# Patient Record
Sex: MALE | Race: WHITE | Employment: UNEMPLOYED | URBAN - METROPOLITAN AREA
[De-identification: names, ages, dates, MRNs, and addresses within clinical notes are randomized per-mention and may not be internally consistent; named-entity substitution may affect disease eponyms.]

---

## 2017-12-07 ENCOUNTER — HOSPITAL ENCOUNTER (OUTPATIENT)
Dept: GENERAL RADIOLOGY | Age: 62
Discharge: HOME | End: 2017-12-07
Attending: FAMILY MEDICINE
Payer: COMMERCIAL

## 2017-12-07 DIAGNOSIS — R05.9 COUGH: ICD-10-CM

## 2017-12-07 PROCEDURE — 71020 XR CHEST PA LAT: CPT

## 2023-01-27 ENCOUNTER — HOSPITAL ENCOUNTER (OUTPATIENT)
Age: 68
Setting detail: OUTPATIENT SURGERY
Discharge: HOME | End: 2023-01-27
Attending: SPECIALIST | Admitting: SPECIALIST
Payer: MEDICARE

## 2023-01-27 VITALS
WEIGHT: 309.7 LBS | BODY MASS INDEX: 36.57 KG/M2 | SYSTOLIC BLOOD PRESSURE: 126 MMHG | OXYGEN SATURATION: 95 % | HEART RATE: 59 BPM | HEIGHT: 77 IN | RESPIRATION RATE: 16 BRPM | DIASTOLIC BLOOD PRESSURE: 92 MMHG | TEMPERATURE: 97.5 F

## 2023-01-27 DIAGNOSIS — R94.39 ABNORMAL STRESS TEST: ICD-10-CM

## 2023-01-27 PROCEDURE — C1769 GUIDE WIRE: HCPCS | Performed by: SPECIALIST

## 2023-01-27 PROCEDURE — 77030029065 HC DRSG HEMO QCLOT ZMED -B

## 2023-01-27 PROCEDURE — 74011000250 HC RX REV CODE- 250: Performed by: SPECIALIST

## 2023-01-27 PROCEDURE — 77030003390 HC NDL ANGI MRTM -A: Performed by: SPECIALIST

## 2023-01-27 PROCEDURE — 74011000636 HC RX REV CODE- 636: Performed by: SPECIALIST

## 2023-01-27 PROCEDURE — 93458 L HRT ARTERY/VENTRICLE ANGIO: CPT | Performed by: SPECIALIST

## 2023-01-27 PROCEDURE — 2709999900 HC NON-CHARGEABLE SUPPLY: Performed by: SPECIALIST

## 2023-01-27 PROCEDURE — 74011250636 HC RX REV CODE- 250/636: Performed by: SPECIALIST

## 2023-01-27 PROCEDURE — C1894 INTRO/SHEATH, NON-LASER: HCPCS | Performed by: SPECIALIST

## 2023-01-27 PROCEDURE — 99152 MOD SED SAME PHYS/QHP 5/>YRS: CPT | Performed by: SPECIALIST

## 2023-01-27 PROCEDURE — 77030041064 HC CATH DX ANGI DXTRTY MEDT -B: Performed by: SPECIALIST

## 2023-01-27 RX ORDER — HEPARIN SODIUM 200 [USP'U]/100ML
INJECTION, SOLUTION INTRAVENOUS
Status: COMPLETED | OUTPATIENT
Start: 2023-01-27 | End: 2023-01-27

## 2023-01-27 RX ORDER — SILODOSIN 8 MG/1
8 CAPSULE ORAL
COMMUNITY

## 2023-01-27 RX ORDER — SODIUM CHLORIDE 0.9 % (FLUSH) 0.9 %
5-40 SYRINGE (ML) INJECTION AS NEEDED
Status: CANCELLED | OUTPATIENT
Start: 2023-01-27

## 2023-01-27 RX ORDER — DIPHENHYDRAMINE HYDROCHLORIDE 50 MG/ML
25 INJECTION, SOLUTION INTRAMUSCULAR; INTRAVENOUS
Status: CANCELLED | OUTPATIENT
Start: 2023-01-27 | End: 2023-01-28

## 2023-01-27 RX ORDER — MIDAZOLAM HYDROCHLORIDE 1 MG/ML
INJECTION, SOLUTION INTRAMUSCULAR; INTRAVENOUS AS NEEDED
Status: DISCONTINUED | OUTPATIENT
Start: 2023-01-27 | End: 2023-01-27 | Stop reason: HOSPADM

## 2023-01-27 RX ORDER — FENTANYL CITRATE 50 UG/ML
INJECTION, SOLUTION INTRAMUSCULAR; INTRAVENOUS AS NEEDED
Status: DISCONTINUED | OUTPATIENT
Start: 2023-01-27 | End: 2023-01-27 | Stop reason: HOSPADM

## 2023-01-27 RX ORDER — SILDENAFIL 100 MG/1
25 TABLET, FILM COATED ORAL
COMMUNITY

## 2023-01-27 RX ORDER — LIDOCAINE HYDROCHLORIDE 10 MG/ML
INJECTION INFILTRATION; PERINEURAL AS NEEDED
Status: DISCONTINUED | OUTPATIENT
Start: 2023-01-27 | End: 2023-01-27 | Stop reason: HOSPADM

## 2023-01-27 RX ORDER — HYDROCORTISONE SODIUM SUCCINATE 100 MG/2ML
100 INJECTION, POWDER, FOR SOLUTION INTRAMUSCULAR; INTRAVENOUS
Status: CANCELLED | OUTPATIENT
Start: 2023-01-27 | End: 2023-01-28

## 2023-01-27 RX ORDER — METOPROLOL TARTRATE 50 MG/1
TABLET ORAL 2 TIMES DAILY
COMMUNITY

## 2023-01-27 RX ORDER — SODIUM CHLORIDE 0.9 % (FLUSH) 0.9 %
5-40 SYRINGE (ML) INJECTION EVERY 8 HOURS
Status: CANCELLED | OUTPATIENT
Start: 2023-01-27

## 2023-01-27 RX ORDER — SODIUM CHLORIDE 9 MG/ML
125 INJECTION, SOLUTION INTRAVENOUS CONTINUOUS
Status: CANCELLED | OUTPATIENT
Start: 2023-01-27 | End: 2023-01-27

## 2023-01-27 RX ORDER — ATORVASTATIN CALCIUM 40 MG/1
TABLET, FILM COATED ORAL DAILY
COMMUNITY

## 2023-01-27 RX ORDER — ASPIRIN 81 MG/1
TABLET ORAL DAILY
COMMUNITY

## 2023-01-27 NOTE — DISCHARGE INSTRUCTIONS
Discharge Instructions:   Cardiac Catheterization/Angiography Discharge Instructions    *Check the puncture site frequently for swelling or bleeding. If you see any bleeding, lie down and apply pressure over the area and call 911. Notify your doctor for any redness, swelling, drainage or oozing from the puncture site. Notify your doctor for any fever or chills. *If the leg with the puncture becomes cold, numb or painful, call Dr cardiologist or PCP at  [phone]    *Activity should be limited for the next 48 hours. Climb stairs as little as possible and avoid any stooping, bending or strenuous activity for 48 hours. No heavy lifting (anything over 10 pounds) for five days. *Do not drive for 24 hours. *You may resume your usual diet. Drink more fluids than usual.    *Have a responsible person drive you home and stay with you for at least 24 hours after your heart catheterization/angiography. *You may remove the bandage from your groin in 24 hours. You may shower in 24 hours. No tub baths, hot tubs or swimming for one week. Do not place any lotions, creams, powders, ointments over the puncture site for one week. You may place a clean band-aid over the puncture site each day for 5 days. Change this daily. Medications: Activity:     As tolerated except do not lift over 10 pounds for 5 days. Diet:     American Heart Association. Follow-up:     Follow up with Shilpa Ling MD on February 13th, 2023 at [address] [facility]  [phone]      If you smoke, STOP!

## 2023-01-27 NOTE — H&P
Date of Surgery Update:  Alberto Stokes was seen and examined. History and physical has been reviewed. The patient has been examined. There have been no significant clinical changes since the completion of the originally dated History and Physical.    Signed By: Suzi Fierro MD     January 27, 2023 12:10 PM       +CP  Cath (5/12/17): Normal cors. ETT (12/13/22):  +CP, - EKG  Cardiolite (12/272/22): Reversible septal/apical defect. EF 38% with sept/apical HK. Deny Galvin 1955 Default Progress NoteVisit Date: Mon, Jan 16, 2023 3:45 pmProvider: Chiara Patel MD (Assistant: Shayla Yu )Location: [facility] AT [facility])[address] [place]. [address] [phone] signed by Randy Sullivan MD on  01/16/2023 04:42:06 PM                         Subjective:CC: Mr. Demar Huston is a 74year old White male. His primary care physician is [facility] [place]. This is a 1  month follow-up visit. Since his last visit, he has had the following testing: event monitor (Event monitor), nuclear stress test (Nuclear stress test), and treadmill stress test (Treadmill stress test). Medication list reviewed. He has a history of coronary artery disease of the native coronary artery, hypertension, and syncope. HPI:   Coronary Artery Disease: Currently, his treatment regimen consists of daily 81 mg aspirin,  Lopressor, and Lipitor. Current symptoms include chest pain, lower extremity edema and shortness of breath. He denies dizziness. Mr. Demar Huston is compliant with alcohol avoidance, tobacco avoidance, taking medications as recommended and prescribed, and the treatment plan. The patient has had prior EBT score of 45 on 5/4/17, exercise stress test (reported performed Treadmill Stress Test: 12/13/22 - 12:17 There was no ECG evidence of ischemia. The patient experienced chest discomfort during stress testing. Normal blood pressure response to exercise.  Blunted heart rate response secondary to medication. Fair exercise capacity. Patient is scheduled for [facility]. ), echocardiogram ( performed 9/8/22: EF 55% There is severe concentric left ventricular hypertrophy. The left atrium is severely enlarged. The right atrium is moderately enlarged. There is mild aortic regurgitation. There is mild to moderate mitral regurgitation. There is moderate tricuspid regurgitation. with moderate pulmonary hypertension. There is mild pulmonary regurgitation. ), and a stress echocardiogram has been done ( performed 5/1/20: 2 min 28 sec. No ECG evidence of ischemia. Poor exercise capacity ). Regarding hypertension:Type Primary Hypertension   Abnormal result of other cardiovascular function study noted. Prior work-up has included a nuclear imaging study ( results: 12/27/22 - Abnormal myocardial perfusion Tetrofosmin Myoview SPECT imaging showing a moderate area of of ischemia in the septal apical wall of the left ventricle. LVEF of 38%. There is apical septal hypokinesis. ). Regarding chest pain: The discomfort is located primarily in the center of the chest.  Typically, individual episodes of chest pain last several minutes. He characterizes the pain as pressure. It seems to be worse with exertion. Pain improves with rest.  Associated symptoms include dyspnea. Past Medical History / Family History / Social History: Last Reviewed on 1/16/2023 04:20 PM by Gema Donis Medical History: Coronary artery disease palpitations- NSVT Sleep Apnea: uses CPAP; Benign Prostatic Hypertrophy Osteoarthritis: primarily affecting the knees; Skin cancer: BCC;  S/P surgical resection; Obesity INFLUENZA VACCINE: 09/2022 COVID-19 VACCINE: was last done unknown date, phizer x3 Past Cardiac Procedures/Tests:Echocardiogram on 9/8/22 - Normal LV systolic function with an estimated ejection fraction of 55%. There is severe concentric left ventricular hypertrophy.   The left atrium is severely enlarged. The right atrium is moderately enlarged. There is mild aortic regurgitation. There is mild to moderate mitral regurgitation. There is moderate tricuspid regurgitation. with moderate pulmonary hypertension. There is mild pulmonary regurgitation. .Nuclear Study:  12/27/22 -  Abnormal myocardial perfusion Tetrofosmin Myoview SPECT imaging showing a moderate area of of ischemia in the septal apical wall of the left ventricle. LVEF of 38%. There is apical septal hypokinesis. Treadmill Stress Test:  12/13/22 - 12:17  There was no ECG evidence of ischemia. The patient experienced chest discomfort during stress testing. Normal  blood pressure response to exercise. Blunted heart rate response secondary to medication. Fair exercise capacity. Patient is scheduled for [facility]. Stress Echo:  10/21/15 - 6 mins 43 secs. There was no ECG evidence of ischemia. No ischemia noted on echocardiogram, normal pre & post global wall motion. The patient did not experience chest discomfort. Normal  blood pressure response to exercise. Normal heart rate response to exercise. Fair exercise capacity. There is mild mitral regurgitation. There is mild to moderate tricuspid regurgitation. with mild pulmonary hypertension. (PA pressure 41 mmHg). Holter Monitor:  10/21/15 - Baseline rhythm was normal sinus. Average heart rate was 93 bpm.    Minimum heart rate of 73 bpm and maximum heart rate of 135 bpm.    There were rare PACs. There were rare PVCs. 1 Ventricular couplets also noted. No pauses noted. No diary was returned. Event Monitor:  05/01/2020 to 05/15/2020.  few premature atrial contractions and premature ventricular contractions. Stress echo 5/3/17 - 6 mins. There was no ECG evidence of ischemia. No ischemia noted on echocardiogram, normal pre & post global wall motion. The patient did not experience chest discomfort. Normal  blood pressure response to exercise.  Normal heart rate response to exercise. Fair exercise capacity. There is mild mitral regurgitation. EBT 17  calcium score is 45Holter monitor 5/3/17 -   Baseline rhythm was normal sinus. Average heart rate was 80 bpm.   Minimum heart rate of 54 bpm and maximum heart rate of 119 bpm.   There were rare PACs. There were frequent PVCs. Ventricular couplets also noted. Ventricular triplets also noted. Runs of non-sustained ventricular tachycardia consisting of 15 beats lasting for 7 seconds. There were PVCs in bigeminy. No pauses noted. No arrhythmias noted during diary entries. Stress echo 2020 - 2 mins 28 secs. There was no ECG evidence of ischemia. No ischemia noted on echocardiogram, normal pre & post global wall motion. The patient did not experience chest discomfort. Normal  blood pressure response to exercise. Blunted heart rate response secondary to medication. Poor exercise capacity. The left atrium is moderately enlarged. There is mild aortic regurgitation. There is trace mitral regurgitation. There is trace tricuspid regurgitation. There is trace pulmonary regurgitation. 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 Alcohol: Non-drinker Substance Abuse History: Last Reviewed on 2023 04:20 PM by Miguel Whitehead Use/Abuse: None Mental Health History: Last Reviewed on 2023 04:20 PM by Nima House NECommunicable Diseases (eg STDs):  Last Reviewed on 2023 04:20 PM by Yessica Elliott Danisha NECurrent Problems: Last Reviewed on 1/16/2023 04:20 PM by Princess Kim ECGPrecordial painSyncope and collapseAbnormal electrocardiogram [ECG] [EKG]Chest painAbnormal EKGSyncopeAtherosclerotic heart disease of native coronary artery without angina pectorisParoxysmal VTCADSleep apneaOther specified postprocedural statesPrior Cardiac CatheterizationCoronary artery disease, of native coronary arteryDyspnea, unspecifiedShortness of breathVentricular premature depolarizationEncounter for preprocedural cardiovascular examinationAtherosclerotic heart disease of native coronary arteryCardiac arrhythmia, unspecifiedEssential (primary) hypertensionLocalized edemaOther chest painChest pain, unspecifiedAbnormal result of other cardiovascular function studyAllergies: Last Reviewed on 1/16/2023 04:20 PM by Andrea Quach Known Allergies. Current Medications: Last Reviewed on 1/16/2023 04:20 PM by Yolande Parra 100 mg oral tablet [prn as directed]aspirin 81 mg oral tablet, delayed release (enteric coated) [2 tablets (162mg) po qd]atorvastatin 40 mg oral tablet [TAKE 1 TABLET(40 MG) BY MOUTH EVERY DAY]metoprolol tartrate 50 mg oral tablet [TAKE 1 TABLET(50 MG) BY MOUTH THREE TIMES DAILY WITH MEALS]Objective:Vitals: Historical: 12/12/2022  BP:    ( (left arm, , sitting, );) 12/12/2022  BP:   126/72 mm Hg ( (left arm, , sitting, );) 12/12/2022  Wt:   312lbsCurrent: 1/16/2023 4:19:33 PMHt:  6 ft, 5 in; Wt: 313 lbs;  BMI: 37. 1BP: 130/74 mm Hg (left arm, sitting); P: 57 bpm (left arm (BP Cuff), sitting);  sCr: 1 mg/dL;  GFR: 96. 96Repeat: 4:19:59 PM  BP:   141/74mm Hg (left arm, standing) Exams: GENERAL:  Alert, oriented to person, place and time. HEENT:  Pinkish palpebral  conjunctivae. Anicteric sclerae. NECK:  No jugular vein engorgement. No bruit. CHEST: Equal expansion. Clear breath sounds. No rales, no wheezing. Heart: Reg rate and rhythm.  Grade 2/6 systolic ejection murmur at the left sternal border area. ABDOMEN:  Soft. Normal active bowel sounds. No tenderness. Extremities: 1+ pitting pedal edema. NEURO:  Grossly intact. Lab/Test Results: Sodium, Serum: 140 (03/16/2022), Potassium, Serum: 4.4 (03/16/2022), Glucose Serum: 103 (03/16/2022), BUN serum/plasma (sCnc): 15 (03/16/2022), Creatinine, Serum: 1.0 (03/16/2022), Total Cholesterol: 100 (03/16/2022), Triglycerides: 84 (03/16/2022), HDL Target >55: 38 (03/16/2022), LDL Target <110: 45 (03/16/2022), AST (SGOT): 26 (03/16/2022), ALT (SGPT): 26 (03/16/2022), WBC count: 7.61 (03/16/2022), RBC count: 4.94 (03/16/2022), Hgb: 15.2 (03/16/2022), Hct: 45.3 (03/16/2022), MCV: 91.7 (03/16/2022), Platelets: 191 (03/18/8129), eGFR-CLARITZA: 60+ (03/16/2022), Procedures: Atherosclerotic heart disease of native coronary artery without angina pectorisECG INTERPRETATION: See scanned EKG for results.   Assessment: I25.10   Atherosclerotic heart disease of native coronary artery without angina pectoris   I10   Essential (primary) hypertension   I25.1   Atherosclerotic heart disease of native coronary artery   I10   Essential (primary) hypertension   R94.39   Abnormal result of other cardiovascular function study   R07.89   Other chest pain   R07.89   Other chest pain   ORDERS: Lab Orders:     93402  Basic metabolic panel (Ca, CO2, Cl, Creatinine, Glu, K, Na, BUN)  (Send-Out)          07795  Complete (CBC), automated (Hgb, Hct, RBC, WBC, and platelet count)  (In-House)          12764  Magnesium level  (Send-Out)      Procedures Ordered:     14828  Electrocardiogram, routine with at least 12 leads; with interpretation and report  (In-House)          61193  Education and train for pt self-mgmt by qualified, nonphysician, julian 30 minutes; individual pt  (Send-Out)          XCATH  Cardiac Cath  (In-House)          55172  Analysis of clinical data stored in computers (e.g., ECG, blood pressures, hematologic data)  (Send-Out)      Other Orders:     8758H  Aspirin or clopidogrel prescribed (CAD)  (Send-Out)          [id number]  Queried Patient for Tobacco Use  (Send-Out)            LDL-C >= 100 mg/dL  (Send-Out)          0556F  Plan of care to achieve lipid control documented (CAD)  (In-House)          4013F  Statin therapy rxd/currently taken(CAD)  (In-House)      Plan: Atherosclerotic heart disease of native coronary artery without angina pectorisCAD a beta blocker 1. Medication list has been reviewed. Continue current medications. Smoking Status:  Nonsmoker 2. Advised the patient regarding diet, exercise, and lifestyle modification. 3.  The patient to call the office if there is any change in his cardiac symptoms. 4.  Explained to the patient the importance of controlling his cardiac risk factors. Testing/Procedures: Cardiac CatheterizationExplained to the patient the indication, procedure, risks, and benefits of cardiac catheterization. The patient understandsand wishes to proceed with the cath to be performed as an outpatient in one week Lab Orders: Basic Metabolic Panel, CBC, Magnesium, and this week @ [facility] Schedule a follow up appointment in 2 weeks. I also recommend that you monitor your BP. Target BP less than 130/80. The above note was transcribed by Jeanmarie Boo RN and authenticated by Dr. Jenise Dorado prior to sign off.

## 2023-01-27 NOTE — PROCEDURES
Cath:  Minimal CAD (only luminal irreg)  Normal LVF (EF 55%)  No AVG/MR  RFA manual    F/U with Dr. Hema Cahrlton 2/13/23 @ 4pm.

## 2023-01-27 NOTE — ROUTINE PROCESS
12:14 PM  Patient arrived. ID and allergies verified verbally with patient. Pt voices understanding of procedure to be performed. Consent obtained. Pt prepped for procedure. Pt denies contrast allergy. Patient denies taking any blood thinners. 1:35 PM  TRANSFER - OUT REPORT:    Verbal report given to Angi(name) on [facility].  being transferred to cath lab(unit) for ordered procedure       Report consisted of patients Situation, Background, Assessment and   Recommendations(SBAR). Information from the following report(s) SBAR was reviewed with the receiving nurse. Lines:   Peripheral IV 01/27/23 Anterior;Right Hand (Active)   Site Assessment Clean, dry, & intact 01/27/23 1244   Phlebitis Assessment 0 01/27/23 1244   Dressing Status Clean, dry, & intact 01/27/23 1244   Dressing Type Transparent 01/27/23 1244   Hub Color/Line Status Pink 01/27/23 1244   Alcohol Cap Used No 01/27/23 1244        Opportunity for questions and clarification was provided. Patient transported with:   Registered Nurse        2:15 PM  TRANSFER - IN REPORT:    Verbal report received from Sreedhar(name) on [facility].  being received from cath lab(unit) for routine post - op      Report consisted of patients Situation, Background, Assessment and   Recommendations(SBAR). Information from the following report(s) SBAR and Procedure Summary was reviewed with the receiving nurse. Opportunity for questions and clarification was provided. Assessment completed upon patients arrival to unit and care assumed. 2:22 PM  Blood aspirated from sheath pulled 6 Fr right Groin. [place] Shark applied. Manual pressure held by [employer]. 3:00 PM  Patient groin site slightly swollen but no pain in the surrounding area. Slightly redden from draping removal. Denies pain    4:00 PM  Discharge instructions and prescriptions reviewed with  Patient and wife. Opportunity provided for questions.  Patient and wife verbalized understanding. Signed copy of discharge placed in the front of patient's chart. 5:00 PM  Patient ambulated tolerated well. IV and tele removed. 5:40 PM  Patient escorted via wheelchair to entrance. Wife driving. Patient discharged into care of wife.

## 2023-05-23 RX ORDER — ATORVASTATIN CALCIUM 40 MG/1
TABLET, FILM COATED ORAL DAILY
COMMUNITY

## 2023-05-23 RX ORDER — SILODOSIN 8 MG/1
8 CAPSULE ORAL
COMMUNITY

## 2023-05-23 RX ORDER — ASPIRIN 81 MG/1
TABLET ORAL DAILY
COMMUNITY

## 2023-05-23 RX ORDER — SILDENAFIL 100 MG/1
25 TABLET, FILM COATED ORAL DAILY PRN
COMMUNITY

## 2023-05-23 RX ORDER — METOPROLOL TARTRATE 50 MG/1
TABLET, FILM COATED ORAL 2 TIMES DAILY
COMMUNITY

## 2023-09-28 ENCOUNTER — HOSPITAL ENCOUNTER (OUTPATIENT)
Facility: HOSPITAL | Age: 68
Setting detail: OUTPATIENT SURGERY
Discharge: HOME | End: 2023-09-28
Attending: SPECIALIST | Admitting: SPECIALIST
Payer: MEDICARE

## 2023-09-28 ENCOUNTER — HOSPITAL ENCOUNTER (OUTPATIENT)
Facility: HOSPITAL | Age: 68
Discharge: HOME | End: 2023-09-30
Attending: SPECIALIST
Payer: MEDICARE

## 2023-09-28 VITALS
HEART RATE: 50 BPM | SYSTOLIC BLOOD PRESSURE: 128 MMHG | HEIGHT: 77 IN | BODY MASS INDEX: 36.35 KG/M2 | OXYGEN SATURATION: 94 % | TEMPERATURE: 97.3 F | WEIGHT: 307.9 LBS | RESPIRATION RATE: 16 BRPM | DIASTOLIC BLOOD PRESSURE: 66 MMHG

## 2023-09-28 VITALS — OXYGEN SATURATION: 98 %

## 2023-09-28 DIAGNOSIS — R06.00 DYSPNEA: ICD-10-CM

## 2023-09-28 LAB
ECHO BSA: 2.75 M2
ECHO BSA: 2.75 M2

## 2023-09-28 PROCEDURE — 93325 DOPPLER ECHO COLOR FLOW MAPG: CPT

## 2023-09-28 PROCEDURE — 6360000002 HC RX W HCPCS: Performed by: SPECIALIST

## 2023-09-28 PROCEDURE — 6370000000 HC RX 637 (ALT 250 FOR IP): Performed by: SPECIALIST

## 2023-09-28 PROCEDURE — 93451 RIGHT HEART CATH: CPT | Performed by: SPECIALIST

## 2023-09-28 PROCEDURE — 99152 MOD SED SAME PHYS/QHP 5/>YRS: CPT | Performed by: SPECIALIST

## 2023-09-28 PROCEDURE — 2709999900 HC NON-CHARGEABLE SUPPLY: Performed by: SPECIALIST

## 2023-09-28 PROCEDURE — C1894 INTRO/SHEATH, NON-LASER: HCPCS | Performed by: SPECIALIST

## 2023-09-28 PROCEDURE — 2500000003 HC RX 250 WO HCPCS: Performed by: SPECIALIST

## 2023-09-28 RX ORDER — FENTANYL CITRATE 50 UG/ML
INJECTION, SOLUTION INTRAMUSCULAR; INTRAVENOUS PRN
Status: COMPLETED | OUTPATIENT
Start: 2023-09-28 | End: 2023-09-28

## 2023-09-28 RX ORDER — SODIUM CHLORIDE 9 MG/ML
INJECTION, SOLUTION INTRAVENOUS PRN
Status: DISCONTINUED | OUTPATIENT
Start: 2023-09-28 | End: 2023-09-28 | Stop reason: HOSPADM

## 2023-09-28 RX ORDER — ACETAMINOPHEN 325 MG/1
650 TABLET ORAL EVERY 4 HOURS PRN
Status: DISCONTINUED | OUTPATIENT
Start: 2023-09-28 | End: 2023-09-28 | Stop reason: HOSPADM

## 2023-09-28 RX ORDER — SODIUM CHLORIDE 0.9 % (FLUSH) 0.9 %
5-40 SYRINGE (ML) INJECTION EVERY 12 HOURS SCHEDULED
Status: DISCONTINUED | OUTPATIENT
Start: 2023-09-28 | End: 2023-09-28 | Stop reason: HOSPADM

## 2023-09-28 RX ORDER — MIDAZOLAM HYDROCHLORIDE 1 MG/ML
INJECTION INTRAMUSCULAR; INTRAVENOUS PRN
Status: COMPLETED | OUTPATIENT
Start: 2023-09-28 | End: 2023-09-28

## 2023-09-28 RX ORDER — SODIUM CHLORIDE 0.9 % (FLUSH) 0.9 %
5-40 SYRINGE (ML) INJECTION PRN
Status: DISCONTINUED | OUTPATIENT
Start: 2023-09-28 | End: 2023-09-28 | Stop reason: HOSPADM

## 2023-09-28 RX ORDER — FENTANYL CITRATE 50 UG/ML
INJECTION, SOLUTION INTRAMUSCULAR; INTRAVENOUS PRN
Status: DISCONTINUED | OUTPATIENT
Start: 2023-09-28 | End: 2023-09-28 | Stop reason: HOSPADM

## 2023-09-28 RX ORDER — SOLIFENACIN SUCCINATE 5 MG/1
5 TABLET, FILM COATED ORAL DAILY
COMMUNITY

## 2023-09-28 RX ADMIN — BENZOCAINE 1 EACH: 200 SPRAY DENTAL; ORAL; PERIODONTAL at 10:50

## 2023-09-28 RX ADMIN — FENTANYL CITRATE 50 MCG: 50 INJECTION, SOLUTION INTRAMUSCULAR; INTRAVENOUS at 10:49

## 2023-09-28 RX ADMIN — MIDAZOLAM HYDROCHLORIDE 2 MG: 1 INJECTION, SOLUTION INTRAMUSCULAR; INTRAVENOUS at 10:50

## 2023-09-28 RX ADMIN — FENTANYL CITRATE 25 MCG: 50 INJECTION, SOLUTION INTRAMUSCULAR; INTRAVENOUS at 10:52

## 2023-09-28 RX ADMIN — MIDAZOLAM HYDROCHLORIDE 2 MG: 1 INJECTION, SOLUTION INTRAMUSCULAR; INTRAVENOUS at 10:52

## 2023-09-28 NOTE — BRIEF OP NOTE
RHC:    RA         13/10/8         55%  RV         68/11  PA         75/25/44        54% (mod-severe pulm HTN)  PCWP   26/35/25  (elevated)    CO = 5.1 (TD), 6.1 (Susu, using sO2 = 89% on pulse ox)  CI = 1.9 (TD), 2.3 (Susu)    RFV manual    F/U with Dr. Klarissa Ferraro 10/12/23 @ 1pm.

## 2023-09-28 NOTE — DISCHARGE INSTRUCTIONS
Discharge Instructions:   Cardiac Catheterization/Angiography Discharge Instructions    *Check the puncture site frequently for swelling or bleeding. If you see any bleeding, lie down and apply pressure over the area and call 911. Notify your doctor for any redness, swelling, drainage or oozing from the puncture site. Notify your doctor for any fever or chills. *If the leg or arm with the puncture becomes cold, numb or painful, call Dr Akosua Ruiz at  [phone]    *Activity should be limited for the next 48 hours. Climb stairs as little as possible and avoid any stooping, bending or strenuous activity for 48 hours. No heavy lifting (anything over 10 pounds) for five days. *Do not drive for 24 hours. *You may resume your usual diet. Drink more fluids than usual.    *Have a responsible person drive you home and stay with you for at least 24 hours after your heart catheterization/angiography. *You may remove the bandage from your groin in 24 hours. You may shower in 24 hours. No tub baths, hot tubs or swimming for one week. Do not place any lotions, creams, powders, ointments over the puncture site for one week. You may place a clean band-aid over the puncture site each day for 5 days. Change this daily. Medications: Take medications as prescribed    Activity:     As tolerated except do not lift over 10 pounds for 3 days. Diet:     American Heart Association. Follow-up:     Follow up with Hermelinda Howell MD on October 12th, 2023 at [address]  [phone]      If you smoke, STOP! Transesophageal Echocardiogram: What to Expect at [address] [place] [address]  A transesophageal echocardiogram is a test to help your doctor look at the inside of your heart. A small device called a transducer directs sound waves toward your heart. The sound waves make a picture of the heart's valves and chambers.   Before the test, your throat was sprayed with medicine to numb

## 2023-09-28 NOTE — PROGRESS NOTES
Received patient from waiting area. Armband and allergies verbally confirmed with patient. Procedure explained and consents signed. 1037: TRANSFER - OUT REPORT:    Verbal report given to [facility] on Narendra Reno.  being transferred to cath lab for routine progression of patient care       Report consisted of patient's Situation, Background, Assessment and   Recommendations(SBAR). Information from the following report(s) Nurse Handoff Report was reviewed with the receiving nurse. Lines:   Peripheral IV 09/28/23 Left Forearm (Active)   Site Assessment Clean, dry & intact 09/28/23 0926   Line Status Blood return noted 09/28/23 0926   Phlebitis Assessment No symptoms 09/28/23 0926   Infiltration Assessment 0 09/28/23 0926   Dressing Status New dressing applied;Clean, dry & intact 09/28/23 0926   Dressing Type Transparent 09/28/23 0926   Dressing Intervention New 09/28/23 0926        Opportunity for questions and clarification was provided. Patient transported with:  Registered Nurse    [phone]: TRANSFER - IN REPORT:    Verbal report received from [facility] on Narendra Reno.  being received from cath lab for routine post-op      Report consisted of patient's Situation, Background, Assessment and   Recommendations(SBAR). Information from the following report(s) Nurse Handoff Report and Neuro Assessment was reviewed with the receiving nurse. Opportunity for questions and clarification was provided. Assessment completed upon patient's arrival to unit and care assumed. 1145: Patient received from cath lab. Patient with quikclot to right groin site. Site clean, dry and intact. No hematoma. VS stable. Patient with no complaints at this time. HOB flat. 1245: Patient HOB elevated 30 degrees. Patient with quikclot to right groin site. Site clean, dry and intact. No hematoma. VS stable. Patient with no complaints at this time. 1315:  Patient HOB elevated 45 degrees.

## 2023-09-28 NOTE — H&P
level.  Make lifestyle modifications to remain healthy. 3.  The patient to call the office if there is any change in his cardiac symptoms. 4.  Explained to the patient the importance of controlling his cardiac risk factors. Testing/Procedures:  BARBARA - to be done to be done at at [facility].  .  The indication, procedure, risks, and benefits of the procedure were explained to the patient. He understands and agreed to proceed with the test.    Additional testin. Right heart catheterization with O2 saturation run for pulmonary hypertension, to be done at  [facility]. Schedule a follow up appointment in 2 weeks. Referrals:  I am referring Mr. Yariel García to an electrophysiologist: Dr. Rocio Coronel for SVT. I also recommend that you monitor your BP. Target BP less than 130/80. The above note was transcribed by Celeste Goodman and authenticated by Dr. Helio Egan prior to sign off. Orders:         LDL-C < 100 mg/dL  (Send-Out)            4086F  Aspirin or clopidogrel prescribed (CAD)  (Send-Out)            [id number]  Queried Patient for Tobacco Use  (Send-Out)            97438  Education and train for pt self-mgmt by qualified, nonphysician, ea 30 minutes; individual pt  (Send-Out)            RFCARD  Cardiologist Referral  (Send-Out)            Other Orders        LDL-C >= 100 mg/dL  (Send-Out)            0556F  Plan of care to achieve lipid control documented (CAD)  (In-House)            4013F  Statin therapy rxd/currently taken(CAD)  (In-House)          Other Patient Education Handouts:     COV Coronary Artery Disease      COV Heart Healthy Diet      COV Hypertension      Patient Recommendations: For  Nonrheumatic mitral (valve) insufficiency:    1. Your medication list has been reviewed. 2.  You have been advised regarding diet, exercise, and lifestyle. modification. 3.  Please call the office if there is any change in your cardiac symptoms.     4.  Explained

## 2023-11-13 ENCOUNTER — HOSPITAL ENCOUNTER (OUTPATIENT)
Facility: HOSPITAL | Age: 68
Discharge: HOME | End: 2023-11-16
Payer: MEDICARE

## 2023-11-13 VITALS
TEMPERATURE: 97.5 F | OXYGEN SATURATION: 96 % | RESPIRATION RATE: 18 BRPM | WEIGHT: 311.8 LBS | HEIGHT: 77 IN | SYSTOLIC BLOOD PRESSURE: 133 MMHG | HEART RATE: 51 BPM | BODY MASS INDEX: 36.82 KG/M2 | DIASTOLIC BLOOD PRESSURE: 78 MMHG

## 2023-11-13 LAB
ABO + RH BLD: NORMAL
ANION GAP SERPL CALC-SCNC: 3 MMOL/L (ref 5–15)
BASOPHILS # BLD: 0.1 K/UL (ref 0–0.1)
BASOPHILS NFR BLD: 1 % (ref 0–1)
BLOOD GROUP ANTIBODIES SERPL: NEGATIVE
BUN SERPL-MCNC: 24 MG/DL (ref 6–20)
BUN/CREAT SERPL: 35 (ref 12–20)
CA-I BLD-MCNC: 9.2 MG/DL (ref 8.5–10.1)
CHLORIDE SERPL-SCNC: 109 MMOL/L (ref 97–108)
CO2 SERPL-SCNC: 25 MMOL/L (ref 21–32)
CREAT SERPL-MCNC: 0.68 MG/DL (ref 0.7–1.3)
DIFFERENTIAL METHOD BLD: ABNORMAL
EKG ATRIAL RATE: 49 BPM
EKG DIAGNOSIS: NORMAL
EKG P AXIS: 59 DEGREES
EKG P-R INTERVAL: 268 MS
EKG Q-T INTERVAL: 458 MS
EKG QRS DURATION: 116 MS
EKG QTC CALCULATION (BAZETT): 413 MS
EKG R AXIS: -42 DEGREES
EKG T AXIS: 63 DEGREES
EKG VENTRICULAR RATE: 49 BPM
EOSINOPHIL # BLD: 0.1 K/UL (ref 0–0.4)
EOSINOPHIL NFR BLD: 2 % (ref 0–7)
ERYTHROCYTE [DISTWIDTH] IN BLOOD BY AUTOMATED COUNT: 14.3 % (ref 11.5–14.5)
GLUCOSE SERPL-MCNC: 97 MG/DL (ref 65–100)
HCT VFR BLD AUTO: 43.9 % (ref 36.6–50.3)
HGB BLD-MCNC: 14.9 G/DL (ref 12.1–17)
IMM GRANULOCYTES # BLD AUTO: 0 K/UL (ref 0–0.04)
IMM GRANULOCYTES NFR BLD AUTO: 0 % (ref 0–0.5)
LYMPHOCYTES # BLD: 1.2 K/UL (ref 0.8–3.5)
LYMPHOCYTES NFR BLD: 21 % (ref 12–49)
MCH RBC QN AUTO: 30.3 PG (ref 26–34)
MCHC RBC AUTO-ENTMCNC: 33.9 G/DL (ref 30–36.5)
MCV RBC AUTO: 89.2 FL (ref 80–99)
MONOCYTES # BLD: 0.6 K/UL (ref 0–1)
MONOCYTES NFR BLD: 10 % (ref 5–13)
NEUTS SEG # BLD: 3.8 K/UL (ref 1.8–8)
NEUTS SEG NFR BLD: 66 % (ref 32–75)
NRBC # BLD: 0 K/UL (ref 0–0.01)
NRBC BLD-RTO: 0 PER 100 WBC
PLATELET # BLD AUTO: 119 K/UL (ref 150–400)
PMV BLD AUTO: 10.7 FL (ref 8.9–12.9)
POTASSIUM SERPL-SCNC: 4.2 MMOL/L (ref 3.5–5.1)
RBC # BLD AUTO: 4.92 M/UL (ref 4.1–5.7)
SODIUM SERPL-SCNC: 137 MMOL/L (ref 136–145)
SPECIMEN EXP DATE BLD: NORMAL
WBC # BLD AUTO: 5.8 K/UL (ref 4.1–11.1)

## 2023-11-13 PROCEDURE — 93005 ELECTROCARDIOGRAM TRACING: CPT | Performed by: INTERNAL MEDICINE

## 2023-11-13 PROCEDURE — 86850 RBC ANTIBODY SCREEN: CPT

## 2023-11-13 PROCEDURE — 80048 BASIC METABOLIC PNL TOTAL CA: CPT

## 2023-11-13 PROCEDURE — 86901 BLOOD TYPING SEROLOGIC RH(D): CPT

## 2023-11-13 PROCEDURE — 36415 COLL VENOUS BLD VENIPUNCTURE: CPT

## 2023-11-13 PROCEDURE — 85025 COMPLETE CBC W/AUTO DIFF WBC: CPT

## 2023-11-13 PROCEDURE — 86900 BLOOD TYPING SEROLOGIC ABO: CPT

## 2023-11-13 RX ORDER — SACUBITRIL AND VALSARTAN 24; 26 MG/1; MG/1
1 TABLET, FILM COATED ORAL 2 TIMES DAILY
COMMUNITY

## 2023-11-13 NOTE — PERIOP NOTE
58- 's office called spoke with Amarilys Kyle LPN made her aware of patient's abnormal labs CBC-- Platelets 733 and BMP-- BUN 24, Creatinine 0.68, BUN/Creatinine ratio 35. Justyna Whitman stated she will make Dr. Farrukh Benitez aware and call back to PAT if any further instructions received.

## 2023-11-16 ENCOUNTER — HOSPITAL ENCOUNTER (OUTPATIENT)
Facility: HOSPITAL | Age: 68
Discharge: HOME | End: 2023-11-16
Attending: INTERNAL MEDICINE | Admitting: INTERNAL MEDICINE
Payer: MEDICARE

## 2023-11-16 ENCOUNTER — ANESTHESIA (OUTPATIENT)
Facility: HOSPITAL | Age: 68
End: 2023-11-16
Payer: MEDICARE

## 2023-11-16 ENCOUNTER — ANESTHESIA EVENT (OUTPATIENT)
Facility: HOSPITAL | Age: 68
End: 2023-11-16
Payer: MEDICARE

## 2023-11-16 VITALS
RESPIRATION RATE: 20 BRPM | DIASTOLIC BLOOD PRESSURE: 80 MMHG | TEMPERATURE: 97.4 F | SYSTOLIC BLOOD PRESSURE: 129 MMHG | HEART RATE: 69 BPM | OXYGEN SATURATION: 93 %

## 2023-11-16 DIAGNOSIS — I47.10 SVT (SUPRAVENTRICULAR TACHYCARDIA): ICD-10-CM

## 2023-11-16 LAB
APTT PPP: 35.1 SEC (ref 21.2–34.1)
INR PPP: 1.1 (ref 0.9–1.1)
PROTHROMBIN TIME: 14.4 SEC (ref 11.9–14.6)
THERAPEUTIC RANGE: ABNORMAL SEC (ref 82–109)

## 2023-11-16 PROCEDURE — 93005 ELECTROCARDIOGRAM TRACING: CPT | Performed by: INTERNAL MEDICINE

## 2023-11-16 PROCEDURE — C1732 CATH, EP, DIAG/ABL, 3D/VECT: HCPCS | Performed by: INTERNAL MEDICINE

## 2023-11-16 PROCEDURE — 7100000011 HC PHASE II RECOVERY - ADDTL 15 MIN: Performed by: INTERNAL MEDICINE

## 2023-11-16 PROCEDURE — 2500000003 HC RX 250 WO HCPCS: Performed by: NURSE ANESTHETIST, CERTIFIED REGISTERED

## 2023-11-16 PROCEDURE — 93623 PRGRMD STIMJ&PACG IV RX NFS: CPT | Performed by: INTERNAL MEDICINE

## 2023-11-16 PROCEDURE — 36415 COLL VENOUS BLD VENIPUNCTURE: CPT

## 2023-11-16 PROCEDURE — C1894 INTRO/SHEATH, NON-LASER: HCPCS | Performed by: INTERNAL MEDICINE

## 2023-11-16 PROCEDURE — 7100000001 HC PACU RECOVERY - ADDTL 15 MIN: Performed by: INTERNAL MEDICINE

## 2023-11-16 PROCEDURE — 93653 COMPRE EP EVAL TX SVT: CPT | Performed by: INTERNAL MEDICINE

## 2023-11-16 PROCEDURE — 7100000010 HC PHASE II RECOVERY - FIRST 15 MIN: Performed by: INTERNAL MEDICINE

## 2023-11-16 PROCEDURE — 2500000003 HC RX 250 WO HCPCS: Performed by: INTERNAL MEDICINE

## 2023-11-16 PROCEDURE — 2720000010 HC SURG SUPPLY STERILE: Performed by: INTERNAL MEDICINE

## 2023-11-16 PROCEDURE — 76937 US GUIDE VASCULAR ACCESS: CPT | Performed by: INTERNAL MEDICINE

## 2023-11-16 PROCEDURE — 6360000002 HC RX W HCPCS: Performed by: INTERNAL MEDICINE

## 2023-11-16 PROCEDURE — 2580000003 HC RX 258: Performed by: INTERNAL MEDICINE

## 2023-11-16 PROCEDURE — 3700000000 HC ANESTHESIA ATTENDED CARE: Performed by: INTERNAL MEDICINE

## 2023-11-16 PROCEDURE — 6360000002 HC RX W HCPCS: Performed by: NURSE ANESTHETIST, CERTIFIED REGISTERED

## 2023-11-16 PROCEDURE — 85610 PROTHROMBIN TIME: CPT

## 2023-11-16 PROCEDURE — C1760 CLOSURE DEV, VASC: HCPCS | Performed by: INTERNAL MEDICINE

## 2023-11-16 PROCEDURE — C1730 CATH, EP, 19 OR FEW ELECT: HCPCS | Performed by: INTERNAL MEDICINE

## 2023-11-16 PROCEDURE — C1893 INTRO/SHEATH, FIXED,NON-PEEL: HCPCS | Performed by: INTERNAL MEDICINE

## 2023-11-16 PROCEDURE — 85730 THROMBOPLASTIN TIME PARTIAL: CPT

## 2023-11-16 PROCEDURE — 2709999900 HC NON-CHARGEABLE SUPPLY: Performed by: INTERNAL MEDICINE

## 2023-11-16 PROCEDURE — 7100000000 HC PACU RECOVERY - FIRST 15 MIN: Performed by: INTERNAL MEDICINE

## 2023-11-16 PROCEDURE — 3700000001 HC ADD 15 MINUTES (ANESTHESIA): Performed by: INTERNAL MEDICINE

## 2023-11-16 RX ORDER — LIDOCAINE HYDROCHLORIDE 20 MG/ML
INJECTION, SOLUTION EPIDURAL; INFILTRATION; INTRACAUDAL; PERINEURAL PRN
Status: DISCONTINUED | OUTPATIENT
Start: 2023-11-16 | End: 2023-11-16 | Stop reason: SDUPTHER

## 2023-11-16 RX ORDER — IPRATROPIUM BROMIDE AND ALBUTEROL SULFATE 2.5; .5 MG/3ML; MG/3ML
1 SOLUTION RESPIRATORY (INHALATION)
Status: DISCONTINUED | OUTPATIENT
Start: 2023-11-16 | End: 2023-11-16 | Stop reason: HOSPADM

## 2023-11-16 RX ORDER — SODIUM CHLORIDE 9 MG/ML
INJECTION, SOLUTION INTRAVENOUS PRN
Status: DISCONTINUED | OUTPATIENT
Start: 2023-11-16 | End: 2023-11-16 | Stop reason: HOSPADM

## 2023-11-16 RX ORDER — SODIUM CHLORIDE 0.9 % (FLUSH) 0.9 %
5-40 SYRINGE (ML) INJECTION PRN
Status: DISCONTINUED | OUTPATIENT
Start: 2023-11-16 | End: 2023-11-16 | Stop reason: HOSPADM

## 2023-11-16 RX ORDER — ONDANSETRON 2 MG/ML
4 INJECTION INTRAMUSCULAR; INTRAVENOUS
Status: DISCONTINUED | OUTPATIENT
Start: 2023-11-16 | End: 2023-11-16 | Stop reason: HOSPADM

## 2023-11-16 RX ORDER — SODIUM CHLORIDE 9 MG/ML
INJECTION, SOLUTION INTRAVENOUS CONTINUOUS
Status: DISCONTINUED | OUTPATIENT
Start: 2023-11-16 | End: 2023-11-16 | Stop reason: HOSPADM

## 2023-11-16 RX ORDER — FENTANYL CITRATE 50 UG/ML
50 INJECTION, SOLUTION INTRAMUSCULAR; INTRAVENOUS EVERY 5 MIN PRN
Status: DISCONTINUED | OUTPATIENT
Start: 2023-11-16 | End: 2023-11-16 | Stop reason: HOSPADM

## 2023-11-16 RX ORDER — LIDOCAINE 4 G/G
1 PATCH TOPICAL AS NEEDED
Status: DISCONTINUED | OUTPATIENT
Start: 2023-11-16 | End: 2023-11-16 | Stop reason: HOSPADM

## 2023-11-16 RX ORDER — PROPOFOL 10 MG/ML
INJECTION, EMULSION INTRAVENOUS PRN
Status: DISCONTINUED | OUTPATIENT
Start: 2023-11-16 | End: 2023-11-16 | Stop reason: SDUPTHER

## 2023-11-16 RX ORDER — HYDROMORPHONE HYDROCHLORIDE 1 MG/ML
0.5 INJECTION, SOLUTION INTRAMUSCULAR; INTRAVENOUS; SUBCUTANEOUS EVERY 5 MIN PRN
Status: DISCONTINUED | OUTPATIENT
Start: 2023-11-16 | End: 2023-11-16 | Stop reason: HOSPADM

## 2023-11-16 RX ORDER — HYDRALAZINE HYDROCHLORIDE 20 MG/ML
10 INJECTION INTRAMUSCULAR; INTRAVENOUS
Status: DISCONTINUED | OUTPATIENT
Start: 2023-11-16 | End: 2023-11-16 | Stop reason: HOSPADM

## 2023-11-16 RX ORDER — GLUCAGON 1 MG/ML
1 KIT INJECTION PRN
Status: DISCONTINUED | OUTPATIENT
Start: 2023-11-16 | End: 2023-11-16 | Stop reason: HOSPADM

## 2023-11-16 RX ORDER — ACETAMINOPHEN 325 MG/1
650 TABLET ORAL EVERY 4 HOURS PRN
Status: DISCONTINUED | OUTPATIENT
Start: 2023-11-16 | End: 2023-11-16 | Stop reason: HOSPADM

## 2023-11-16 RX ORDER — SODIUM CHLORIDE 0.9 % (FLUSH) 0.9 %
5-40 SYRINGE (ML) INJECTION EVERY 12 HOURS SCHEDULED
Status: DISCONTINUED | OUTPATIENT
Start: 2023-11-16 | End: 2023-11-16 | Stop reason: HOSPADM

## 2023-11-16 RX ORDER — ONDANSETRON 2 MG/ML
INJECTION INTRAMUSCULAR; INTRAVENOUS PRN
Status: DISCONTINUED | OUTPATIENT
Start: 2023-11-16 | End: 2023-11-16 | Stop reason: SDUPTHER

## 2023-11-16 RX ORDER — HEPARIN SODIUM 200 [USP'U]/100ML
INJECTION, SOLUTION INTRAVENOUS CONTINUOUS PRN
Status: COMPLETED | OUTPATIENT
Start: 2023-11-16 | End: 2023-11-16

## 2023-11-16 RX ORDER — MIDAZOLAM HYDROCHLORIDE 1 MG/ML
INJECTION INTRAMUSCULAR; INTRAVENOUS PRN
Status: DISCONTINUED | OUTPATIENT
Start: 2023-11-16 | End: 2023-11-16 | Stop reason: SDUPTHER

## 2023-11-16 RX ORDER — DIPHENHYDRAMINE HYDROCHLORIDE 50 MG/ML
12.5 INJECTION INTRAMUSCULAR; INTRAVENOUS
Status: DISCONTINUED | OUTPATIENT
Start: 2023-11-16 | End: 2023-11-16 | Stop reason: HOSPADM

## 2023-11-16 RX ORDER — LABETALOL HYDROCHLORIDE 5 MG/ML
10 INJECTION, SOLUTION INTRAVENOUS
Status: DISCONTINUED | OUTPATIENT
Start: 2023-11-16 | End: 2023-11-16 | Stop reason: HOSPADM

## 2023-11-16 RX ORDER — OXYCODONE HYDROCHLORIDE 5 MG/1
5 TABLET ORAL PRN
Status: DISCONTINUED | OUTPATIENT
Start: 2023-11-16 | End: 2023-11-16 | Stop reason: HOSPADM

## 2023-11-16 RX ORDER — LORAZEPAM 2 MG/ML
0.5 INJECTION INTRAMUSCULAR
Status: DISCONTINUED | OUTPATIENT
Start: 2023-11-16 | End: 2023-11-16 | Stop reason: HOSPADM

## 2023-11-16 RX ORDER — SODIUM CHLORIDE, SODIUM LACTATE, POTASSIUM CHLORIDE, CALCIUM CHLORIDE 600; 310; 30; 20 MG/100ML; MG/100ML; MG/100ML; MG/100ML
INJECTION, SOLUTION INTRAVENOUS ONCE
Status: DISCONTINUED | OUTPATIENT
Start: 2023-11-16 | End: 2023-11-16 | Stop reason: HOSPADM

## 2023-11-16 RX ORDER — ONDANSETRON 2 MG/ML
4 INJECTION INTRAMUSCULAR; INTRAVENOUS EVERY 6 HOURS PRN
Status: DISCONTINUED | OUTPATIENT
Start: 2023-11-16 | End: 2023-11-16 | Stop reason: HOSPADM

## 2023-11-16 RX ORDER — MEPERIDINE HYDROCHLORIDE 25 MG/ML
12.5 INJECTION INTRAMUSCULAR; INTRAVENOUS; SUBCUTANEOUS EVERY 5 MIN PRN
Status: DISCONTINUED | OUTPATIENT
Start: 2023-11-16 | End: 2023-11-16 | Stop reason: HOSPADM

## 2023-11-16 RX ORDER — DEXAMETHASONE SODIUM PHOSPHATE 4 MG/ML
INJECTION, SOLUTION INTRA-ARTICULAR; INTRALESIONAL; INTRAMUSCULAR; INTRAVENOUS; SOFT TISSUE PRN
Status: DISCONTINUED | OUTPATIENT
Start: 2023-11-16 | End: 2023-11-16 | Stop reason: SDUPTHER

## 2023-11-16 RX ORDER — DEXTROSE MONOHYDRATE 100 MG/ML
INJECTION, SOLUTION INTRAVENOUS CONTINUOUS PRN
Status: DISCONTINUED | OUTPATIENT
Start: 2023-11-16 | End: 2023-11-16 | Stop reason: HOSPADM

## 2023-11-16 RX ORDER — METOCLOPRAMIDE HYDROCHLORIDE 5 MG/ML
10 INJECTION INTRAMUSCULAR; INTRAVENOUS
Status: DISCONTINUED | OUTPATIENT
Start: 2023-11-16 | End: 2023-11-16 | Stop reason: HOSPADM

## 2023-11-16 RX ORDER — OXYCODONE HYDROCHLORIDE 5 MG/1
10 TABLET ORAL PRN
Status: DISCONTINUED | OUTPATIENT
Start: 2023-11-16 | End: 2023-11-16 | Stop reason: HOSPADM

## 2023-11-16 RX ORDER — FENTANYL CITRATE 50 UG/ML
INJECTION, SOLUTION INTRAMUSCULAR; INTRAVENOUS PRN
Status: DISCONTINUED | OUTPATIENT
Start: 2023-11-16 | End: 2023-11-16 | Stop reason: SDUPTHER

## 2023-11-16 RX ADMIN — FENTANYL CITRATE 50 MCG: 50 INJECTION, SOLUTION INTRAMUSCULAR; INTRAVENOUS at 15:33

## 2023-11-16 RX ADMIN — FENTANYL CITRATE 50 MCG: 50 INJECTION, SOLUTION INTRAMUSCULAR; INTRAVENOUS at 15:26

## 2023-11-16 RX ADMIN — ONDANSETRON 4 MG: 2 INJECTION INTRAMUSCULAR; INTRAVENOUS at 15:37

## 2023-11-16 RX ADMIN — SODIUM CHLORIDE: 9 INJECTION, SOLUTION INTRAVENOUS at 15:26

## 2023-11-16 RX ADMIN — MIDAZOLAM HYDROCHLORIDE 2 MG: 2 INJECTION, SOLUTION INTRAMUSCULAR; INTRAVENOUS at 15:26

## 2023-11-16 RX ADMIN — PROPOFOL 100 MG: 10 INJECTION, EMULSION INTRAVENOUS at 15:33

## 2023-11-16 RX ADMIN — DEXAMETHASONE SODIUM PHOSPHATE 4 MG: 4 INJECTION, SOLUTION INTRA-ARTICULAR; INTRALESIONAL; INTRAMUSCULAR; INTRAVENOUS; SOFT TISSUE at 15:37

## 2023-11-16 RX ADMIN — LIDOCAINE HYDROCHLORIDE 80 MG: 20 INJECTION, SOLUTION EPIDURAL; INFILTRATION; INTRACAUDAL; PERINEURAL at 15:33

## 2023-11-16 ASSESSMENT — PAIN SCALES - GENERAL
PAINLEVEL_OUTOF10: 0

## 2023-11-16 ASSESSMENT — PAIN - FUNCTIONAL ASSESSMENT: PAIN_FUNCTIONAL_ASSESSMENT: 0-10

## 2023-11-16 NOTE — PERIOP NOTE
Pt ambulated around unit and to bathroom, no difficulties. No complaints at this time. Discharge instructions reviewed with pt and wife, verbalizes understanding. Discharged via wheelchair to private vehicle.

## 2023-11-16 NOTE — H&P
[facility]  History and Physical      CHIEF COMPLAINT:  SVT    HISTORY OF PRESENT ILLNESS:      The patient is a presents for an elective EP procedure. Past Medical History:        Diagnosis Date    Hyperlipidemia     Hypertension     Skin cancer     basal cell per patient    Sleep apnea     Patient stated uses a CPAP    SVT (supraventricular tachycardia)      Past Surgical History:        Procedure Laterality Date    CARDIAC PROCEDURE N/A 09/28/2023    Right heart cath performed by Sera Bhakta MD at [facility] CATH LAB    CATARACT EXTRACTION Bilateral     COLONOSCOPY      FOOT SURGERY Right     Right great toe bone spur removed    KNEE ARTHROSCOPY Right     PROSTATE SURGERY      Laser    SHOULDER SURGERY Left      Medications Prior to Admission:    Medications Prior to Admission: sacubitril-valsartan (ENTRESTO) 24-26 MG per tablet, Take 1 tablet by mouth 2 times daily  Probiotic Product (PROBIOTIC DAILY PO), Take 1 tablet by mouth daily  Multiple Vitamins-Minerals (MULTIVITAMIN ADULTS PO), Take 1 tablet by mouth daily (with breakfast)  [DISCONTINUED] solifenacin (VESICARE) 5 MG tablet, Take 1 tablet by mouth daily (Patient not taking: Reported on 11/13/2023)  [DISCONTINUED] amoxicillin-clavulanate (AUGMENTIN) 125-31.25 MG/5ML suspension, Take by mouth 2 times daily (Patient not taking: Reported on 11/13/2023)  aspirin 81 MG EC tablet, Take 1 tablet by mouth daily  atorvastatin (LIPITOR) 40 MG tablet, Take 1 tablet by mouth daily  [DISCONTINUED] metoprolol tartrate (LOPRESSOR) 50 MG tablet, Take 1 tablet by mouth 2 times daily  [DISCONTINUED] sildenafil (VIAGRA) 100 MG tablet, Take 25 mg by mouth daily as needed (Patient not taking: Reported on 11/13/2023)    Allergies:  Sulfa antibiotics and Codeine    Social History: no cocaine use  Family History: no inherited arrhythmias  REVIEW OF SYSTEMS: A comprehensive review of systems was obtained and pertinent positives listed in the HPI.  All other systems negative.    PHYSICAL EXAM:  Gen: No distress  Eyes: EOMI  CV: RRR  Chest: CTAB  Abd: soft  Ext: no edema    ASSESSMENT AND PLAN:      SVT: ablation today    Ray Ha MD   Cardiac Electrophysiologist   [facility]   [address] [facility] ([address])  [address] [facility] ([address])  [address])   Phone: [phone]   Fax: [phone]   Cell: [phone]

## 2023-11-17 LAB
EKG ATRIAL RATE: 68 BPM
EKG DIAGNOSIS: NORMAL
EKG P AXIS: 28 DEGREES
EKG P-R INTERVAL: 264 MS
EKG Q-T INTERVAL: 414 MS
EKG QRS DURATION: 112 MS
EKG QTC CALCULATION (BAZETT): 440 MS
EKG R AXIS: -50 DEGREES
EKG T AXIS: 76 DEGREES
EKG VENTRICULAR RATE: 68 BPM

## 2023-11-17 NOTE — ANESTHESIA POSTPROCEDURE EVALUATION
Department of Anesthesiology  Postprocedure Note    Patient: Bre Beaulieu   MRN: [id number]  YOB: 1955  Date of evaluation: 11/17/2023      Procedure Summary     Date: 11/16/23 Room / Location: [facility] EP LAB / [facility] ELECTROPHYSIOLOGY    Anesthesia Start: 8218 Anesthesia Stop: 5989    Procedures:       Ablation SVT      Ultrasound guided vascular access      Drug stimulation      Ep 3d mapping Diagnosis:       SVT (supraventricular tachycardia)      (SVT (supraventricular tachycardia) [I47.10])    Providers: Hiral Hyde MD Responsible Provider: Tate Riggs MD    Anesthesia Type: General ASA Status: 3          Anesthesia Type: General    Eliza Phase I: Eliza Score: 10    Eliza Phase II: Eliza Score: 10      Anesthesia Post Evaluation    Patient location during evaluation: PACU  Patient participation: complete - patient participated  Level of consciousness: sleepy but conscious  Pain score: 0  Airway patency: patent  Nausea & Vomiting: no nausea and no vomiting  Complications: no  Cardiovascular status: hemodynamically stable  Respiratory status: acceptable  Hydration status: stable  Multimodal analgesia pain management approach

## 2024-06-21 ENCOUNTER — APPOINTMENT (OUTPATIENT)
Facility: HOSPITAL | Age: 69
End: 2024-06-21
Payer: MEDICARE

## 2024-06-21 ENCOUNTER — HOSPITAL ENCOUNTER (EMERGENCY)
Facility: HOSPITAL | Age: 69
Discharge: HOME | End: 2024-06-21
Attending: STUDENT IN AN ORGANIZED HEALTH CARE EDUCATION/TRAINING PROGRAM
Payer: MEDICARE

## 2024-06-21 VITALS
BODY MASS INDEX: 37.19 KG/M2 | HEART RATE: 56 BPM | SYSTOLIC BLOOD PRESSURE: 132 MMHG | DIASTOLIC BLOOD PRESSURE: 86 MMHG | OXYGEN SATURATION: 93 % | HEIGHT: 77 IN | RESPIRATION RATE: 24 BRPM | TEMPERATURE: 97.5 F | WEIGHT: 315 LBS

## 2024-06-21 DIAGNOSIS — R07.9 CHEST PAIN, UNSPECIFIED TYPE: Primary | ICD-10-CM

## 2024-06-21 LAB
ALBUMIN SERPL-MCNC: 4.2 G/DL (ref 3.5–5)
ALBUMIN/GLOB SERPL: 1.2 (ref 1.1–2.2)
ALP SERPL-CCNC: 53 U/L (ref 45–117)
ALT SERPL-CCNC: 23 U/L (ref 12–78)
ANION GAP SERPL CALC-SCNC: 6 MMOL/L (ref 5–15)
AST SERPL-CCNC: 17 U/L (ref 15–37)
BASOPHILS # BLD: 0 K/UL (ref 0–0.1)
BASOPHILS NFR BLD: 1 % (ref 0–1)
BILIRUB SERPL-MCNC: 1.5 MG/DL (ref 0.2–1)
BUN SERPL-MCNC: 19 MG/DL (ref 6–20)
BUN/CREAT SERPL: 26 (ref 12–20)
CALCIUM SERPL-MCNC: 9 MG/DL (ref 8.5–10.1)
CHLORIDE SERPL-SCNC: 107 MMOL/L (ref 97–108)
CO2 SERPL-SCNC: 25 MMOL/L (ref 21–32)
COMMENT:: NORMAL
CREAT SERPL-MCNC: 0.72 MG/DL (ref 0.7–1.3)
DIFFERENTIAL METHOD BLD: ABNORMAL
EOSINOPHIL # BLD: 0.1 K/UL (ref 0–0.4)
EOSINOPHIL NFR BLD: 2 % (ref 0–7)
ERYTHROCYTE [DISTWIDTH] IN BLOOD BY AUTOMATED COUNT: 14.6 % (ref 11.5–14.5)
GLOBULIN SER CALC-MCNC: 3.4 G/DL (ref 2–4)
GLUCOSE SERPL-MCNC: 96 MG/DL (ref 65–100)
HCT VFR BLD AUTO: 38.9 % (ref 36.6–50.3)
HGB BLD-MCNC: 13.5 G/DL (ref 12.1–17)
IMM GRANULOCYTES # BLD AUTO: 0 K/UL (ref 0–0.04)
IMM GRANULOCYTES NFR BLD AUTO: 0 % (ref 0–0.5)
LIPASE SERPL-CCNC: 22 U/L (ref 13–75)
LYMPHOCYTES # BLD: 1.1 K/UL (ref 0.8–3.5)
LYMPHOCYTES NFR BLD: 22 % (ref 12–49)
MAGNESIUM SERPL-MCNC: 2.4 MG/DL (ref 1.6–2.4)
MCH RBC QN AUTO: 32.1 PG (ref 26–34)
MCHC RBC AUTO-ENTMCNC: 34.7 G/DL (ref 30–36.5)
MCV RBC AUTO: 92.6 FL (ref 80–99)
MONOCYTES # BLD: 0.9 K/UL (ref 0–1)
MONOCYTES NFR BLD: 19 % (ref 5–13)
NEUTS SEG # BLD: 2.7 K/UL (ref 1.8–8)
NEUTS SEG NFR BLD: 56 % (ref 32–75)
NRBC # BLD: 0 K/UL (ref 0–0.01)
NRBC BLD-RTO: 0 PER 100 WBC
NT PRO BNP: 584 PG/ML
PLATELET # BLD AUTO: 124 K/UL (ref 150–400)
PMV BLD AUTO: 9.9 FL (ref 8.9–12.9)
POTASSIUM SERPL-SCNC: 3.8 MMOL/L (ref 3.5–5.1)
PROT SERPL-MCNC: 7.6 G/DL (ref 6.4–8.2)
RBC # BLD AUTO: 4.2 M/UL (ref 4.1–5.7)
RBC MORPH BLD: ABNORMAL
SODIUM SERPL-SCNC: 138 MMOL/L (ref 136–145)
SPECIMEN HOLD: NORMAL
TROPONIN I SERPL HS-MCNC: 14 NG/L (ref 0–76)
TROPONIN I SERPL HS-MCNC: 16 NG/L (ref 0–76)
WBC # BLD AUTO: 4.8 K/UL (ref 4.1–11.1)

## 2024-06-21 PROCEDURE — 83690 ASSAY OF LIPASE: CPT

## 2024-06-21 PROCEDURE — 99285 EMERGENCY DEPT VISIT HI MDM: CPT

## 2024-06-21 PROCEDURE — 83735 ASSAY OF MAGNESIUM: CPT

## 2024-06-21 PROCEDURE — 80053 COMPREHEN METABOLIC PANEL: CPT

## 2024-06-21 PROCEDURE — 85025 COMPLETE CBC W/AUTO DIFF WBC: CPT

## 2024-06-21 PROCEDURE — 84484 ASSAY OF TROPONIN QUANT: CPT

## 2024-06-21 PROCEDURE — 6370000000 HC RX 637 (ALT 250 FOR IP): Performed by: STUDENT IN AN ORGANIZED HEALTH CARE EDUCATION/TRAINING PROGRAM

## 2024-06-21 PROCEDURE — 83880 ASSAY OF NATRIURETIC PEPTIDE: CPT

## 2024-06-21 PROCEDURE — 71046 X-RAY EXAM CHEST 2 VIEWS: CPT

## 2024-06-21 PROCEDURE — 93005 ELECTROCARDIOGRAM TRACING: CPT | Performed by: EMERGENCY MEDICINE

## 2024-06-21 PROCEDURE — 36415 COLL VENOUS BLD VENIPUNCTURE: CPT

## 2024-06-21 RX ORDER — ASPIRIN 81 MG/1
324 TABLET, CHEWABLE ORAL
Status: COMPLETED | OUTPATIENT
Start: 2024-06-21 | End: 2024-06-21

## 2024-06-21 RX ADMIN — ASPIRIN 324 MG: 81 TABLET, CHEWABLE ORAL at 17:28

## 2024-06-21 ASSESSMENT — PAIN SCALES - GENERAL
PAINLEVEL_OUTOF10: 3
PAINLEVEL_OUTOF10: 5

## 2024-06-21 ASSESSMENT — PAIN - FUNCTIONAL ASSESSMENT: PAIN_FUNCTIONAL_ASSESSMENT: 0-10

## 2024-06-21 ASSESSMENT — PAIN DESCRIPTION - DESCRIPTORS: DESCRIPTORS: DULL

## 2024-06-21 ASSESSMENT — PAIN DESCRIPTION - LOCATION: LOCATION: CHEST

## 2024-06-21 NOTE — ED TRIAGE NOTES
Pt ambulatory to ED c/o progressive substernal chest pain described as \"dull\" x 5-6 days with dyspnea on exertion x 1 day. EKG Sinus Tach with 1st Degree AVB in triage. Hx of CHF with Ablation x 2 in the past 6 months.

## 2024-06-21 NOTE — ED PROVIDER NOTES
EMERGENCY DEPARTMENT PHYSICIAN NOTE     Patient: Hipolito Hernandez Jr.     Time of Service: 6/21/2024  5:03 PM     Chief complaint:   Chief Complaint   Patient presents with    Chest Pain        HISTORY:  Patient is a 68 y.o. male who presents to the emergency department with complaints of central chest pain for the last 5 days.  Denies any history of heart attack but has had arrhythmia issues and has had ablations.  On dabigatran at this time after having a most recent hospitalization for arrhythmia.  Patient's vital signs are stable and he is afebrile.  Patient denies any history of hypertension but does have a high cholesterol.  Patient has had some intermittent heart failure likely due to arrhythmia that resolved with treatment of arrhythmia.  Patient takes hydralazine, Entresto and furosemide as well.  Patient a brief period of shortness of breath last night but none before last night none today.  No fevers cough or infectious symptoms.      Past Medical History:   Diagnosis Date    Hyperlipidemia     Hypertension     Skin cancer     basal cell per patient    Sleep apnea     Patient stated uses a CPAP    SVT (supraventricular tachycardia) (HCC)         Past Surgical History:   Procedure Laterality Date    CARDIAC PROCEDURE N/A 09/28/2023    Right heart cath performed by Fabian Gunn MD at [facility] CARDIAC CATH LAB    CATARACT EXTRACTION Bilateral     COLONOSCOPY      EP DEVICE PROCEDURE N/A 11/16/2023    Ablation SVT performed by Alden Noonan MD at [facility] ELECTROPHYSIOLOGY    EP DEVICE PROCEDURE N/A 11/16/2023    Drug stimulation performed by Alden Noonan MD at [facility] ELECTROPHYSIOLOGY    EP DEVICE PROCEDURE N/A 11/16/2023    Ep 3d mapping performed by Alden Noonan MD at [facility] ELECTROPHYSIOLOGY    FOOT SURGERY Right     Right great toe bone spur removed    INVASIVE VASCULAR N/A 11/16/2023    Ultrasound guided vascular access performed by Alden Noonan MD at [facility] ELECTROPHYSIOLOGY    KNEE ARTHROSCOPY Right     PROSTATE  unremarkable.  Cardiac monitoring without acute findings.  Patient feeling better while in the ED.  Initial troponin within normal limits but above 5 so second was ordered however patient and family believe.  As patient's pain is not present for some time leaving was appropriate.  Repeat troponin was sent and was within normal limits without significant delta.  Patient has follow-up with cardiology on Monday to go over his symptoms and pain.  Patient did have an elevation in bilirubin but I suspect this is not due to any sort of biliary process.  No abdominal pain right upper quadrant pain.  Liver enzymes within normal limits.  No signs of pneumonia pancreatitis.  Mild elevation in BNP but no pulmonary edema or respiratory distress.  Suspect baseline due to patient's reported history of mild CHF/resolved CHF as well as patient's intermittent arrhythmias.  Patient stable for discharge home and outpatient follow-up    Amount and/or Complexity of Data Reviewed  Independent Historian: spouse  Labs: ordered. Decision-making details documented in ED Course.  Radiology: ordered.  ECG/medicine tests: ordered.    Risk  OTC drugs.          Procedures       DISPOSITION: Decision To Discharge 06/21/2024 07:21:11 PM    CLINICAL IMPRESSION:   1. Chest pain, unspecified type         Further personalized recommendations for outpatient care as below.    Key discharge instructions and summary of care provided in AVS: You presented to ED with chest pain for 5 days, some thought that it could be due to lifting her dog up however pain has been present for a significant amount of time.  Workup for heart attack showed no concerning findings EKG.  Initial troponin within normal limits.  BNP mildly elevated showing some heart failure but x-ray shows no fluid on your lungs or exacerbation.  Continue medication as prescribed follow-up with your primary cardiologist outpatient. if symptoms change or worsen return to the ED for further

## 2024-06-21 NOTE — DISCHARGE INSTRUCTIONS
You presented to ED with chest pain for 5 days, some thought that it could be due to lifting her dog up however pain has been present for a significant amount of time.  Workup for heart attack showed no concerning findings EKG.  Initial troponin within normal limits.  BNP mildly elevated showing some heart failure but x-ray shows no fluid on your lungs or exacerbation.  Continue medication as prescribed follow-up with your primary cardiologist outpatient. if symptoms change or worsen return to the ED for further evaluation

## 2024-06-24 LAB
EKG ATRIAL RATE: 55 BPM
EKG DIAGNOSIS: NORMAL
EKG P AXIS: 43 DEGREES
EKG P-R INTERVAL: 268 MS
EKG Q-T INTERVAL: 402 MS
EKG QRS DURATION: 106 MS
EKG QTC CALCULATION (BAZETT): 384 MS
EKG R AXIS: -52 DEGREES
EKG T AXIS: 47 DEGREES
EKG VENTRICULAR RATE: 55 BPM

## 2024-07-11 ENCOUNTER — HOSPITAL ENCOUNTER (OUTPATIENT)
Age: 69
Discharge: HOME | End: 2024-07-11
Payer: MEDICARE

## 2024-07-11 ENCOUNTER — OFFICE VISIT (OUTPATIENT)
Age: 69
End: 2024-07-11
Payer: MEDICARE

## 2024-07-11 VITALS — WEIGHT: 315 LBS | HEIGHT: 77 IN | BODY MASS INDEX: 37.19 KG/M2

## 2024-07-11 DIAGNOSIS — M25.561 RIGHT KNEE PAIN, UNSPECIFIED CHRONICITY: Primary | ICD-10-CM

## 2024-07-11 DIAGNOSIS — M17.11 OSTEOARTHRITIS OF RIGHT KNEE, UNSPECIFIED OSTEOARTHRITIS TYPE: Primary | ICD-10-CM

## 2024-07-11 DIAGNOSIS — Z01.818 PRE-OP TESTING: ICD-10-CM

## 2024-07-11 DIAGNOSIS — M25.561 RIGHT KNEE PAIN, UNSPECIFIED CHRONICITY: ICD-10-CM

## 2024-07-11 DIAGNOSIS — I47.10 SVT (SUPRAVENTRICULAR TACHYCARDIA) (HCC): ICD-10-CM

## 2024-07-11 PROCEDURE — 1036F TOBACCO NON-USER: CPT | Performed by: ORTHOPAEDIC SURGERY

## 2024-07-11 PROCEDURE — 73562 X-RAY EXAM OF KNEE 3: CPT

## 2024-07-11 PROCEDURE — G8417 CALC BMI ABV UP PARAM F/U: HCPCS | Performed by: ORTHOPAEDIC SURGERY

## 2024-07-11 PROCEDURE — 99204 OFFICE O/P NEW MOD 45 MIN: CPT | Performed by: ORTHOPAEDIC SURGERY

## 2024-07-11 PROCEDURE — G8427 DOCREV CUR MEDS BY ELIG CLIN: HCPCS | Performed by: ORTHOPAEDIC SURGERY

## 2024-07-11 PROCEDURE — 1123F ACP DISCUSS/DSCN MKR DOCD: CPT | Performed by: ORTHOPAEDIC SURGERY

## 2024-07-11 PROCEDURE — 3017F COLORECTAL CA SCREEN DOC REV: CPT | Performed by: ORTHOPAEDIC SURGERY

## 2024-07-11 NOTE — PROGRESS NOTES
Name: Hipolito Hernandez Jr.    : 1955     [facility] ORTHOPAEDICS AND SPORTS MEDICINE  [address], [address]  [facility] [address]  Dept: [phone]  Dept Fax: [phone]     Chief Complaint   Patient presents with    Knee Pain        Ht 1.956 m (6' 5\")   Wt (!) 142.9 kg (315 lb)   BMI 37.35 kg/m²      Allergies   Allergen Reactions    Sulfamethoxazole-Trimethoprim Rash        Current Outpatient Medications   Medication Sig Dispense Refill    sacubitril-valsartan (ENTRESTO) 24-26 MG per tablet Take 1 tablet by mouth 2 times daily      Probiotic Product (PROBIOTIC DAILY PO) Take 1 tablet by mouth daily      Multiple Vitamins-Minerals (MULTIVITAMIN ADULTS PO) Take 1 tablet by mouth daily (with breakfast)      aspirin 81 MG EC tablet Take 1 tablet by mouth daily      atorvastatin (LIPITOR) 40 MG tablet Take 1 tablet by mouth daily       No current facility-administered medications for this visit.       Patient Active Problem List   Diagnosis    SVT (supraventricular tachycardia) (HCC)      Family History   Problem Relation Age of Onset    COPD Mother     Stroke Brother     Leukemia Maternal Grandmother        Past Surgical History:   Procedure Laterality Date    CARDIAC PROCEDURE N/A 2023    Right heart cath performed by Fabian Gunn MD at [facility] CARDIAC CATH LAB    CATARACT EXTRACTION Bilateral     COLONOSCOPY      EP DEVICE PROCEDURE N/A 2023    Ablation SVT performed by Alden Noonan MD at [facility] ELECTROPHYSIOLOGY    EP DEVICE PROCEDURE N/A 2023    Drug stimulation performed by Alden Noonan MD at [facility] ELECTROPHYSIOLOGY    EP DEVICE PROCEDURE N/A 2023    Ep 3d mapping performed by Alden Noonan MD at [facility] ELECTROPHYSIOLOGY    FOOT SURGERY Right     Right great toe bone spur removed    INVASIVE VASCULAR N/A 2023    Ultrasound guided vascular access performed by Alden Noonan MD at [facility] ELECTROPHYSIOLOGY    KNEE

## 2024-10-01 DIAGNOSIS — M17.11 PRIMARY OSTEOARTHRITIS OF RIGHT KNEE: Primary | ICD-10-CM

## 2024-11-14 ENCOUNTER — TELEPHONE (OUTPATIENT)
Age: 69
End: 2024-11-14

## 2024-11-14 NOTE — TELEPHONE ENCOUNTER
Pt has sx scheduled for a RT TKR 12/16/2024    Pt would like to cancel at this time due to other health issues.

## 2024-12-20 ENCOUNTER — HOSPITAL ENCOUNTER (OUTPATIENT)
Facility: HOSPITAL | Age: 69
Discharge: HOME | End: 2024-12-23
Payer: MEDICARE

## 2024-12-20 VITALS
SYSTOLIC BLOOD PRESSURE: 122 MMHG | HEIGHT: 75 IN | TEMPERATURE: 97.2 F | BODY MASS INDEX: 39.17 KG/M2 | HEART RATE: 65 BPM | WEIGHT: 315 LBS | RESPIRATION RATE: 18 BRPM | DIASTOLIC BLOOD PRESSURE: 64 MMHG | OXYGEN SATURATION: 98 %

## 2024-12-20 LAB
ABO + RH BLD: NORMAL
ALBUMIN SERPL-MCNC: 4.1 G/DL (ref 3.5–5)
ALBUMIN/GLOB SERPL: 1.6 (ref 1.1–2.2)
ALP SERPL-CCNC: 40 U/L (ref 45–117)
ALT SERPL-CCNC: 21 U/L (ref 12–78)
ANION GAP SERPL CALC-SCNC: 4 MMOL/L (ref 2–12)
AST SERPL W P-5'-P-CCNC: 13 U/L (ref 15–37)
BASOPHILS # BLD: 0.1 K/UL (ref 0–0.1)
BASOPHILS NFR BLD: 1 % (ref 0–1)
BILIRUB SERPL-MCNC: 1.5 MG/DL (ref 0.2–1)
BLOOD GROUP ANTIBODIES SERPL: NEGATIVE
BUN SERPL-MCNC: 28 MG/DL (ref 6–20)
BUN/CREAT SERPL: 33 (ref 12–20)
CA-I BLD-MCNC: 8.6 MG/DL (ref 8.5–10.1)
CHLORIDE SERPL-SCNC: 110 MMOL/L (ref 97–108)
CO2 SERPL-SCNC: 25 MMOL/L (ref 21–32)
CREAT SERPL-MCNC: 0.86 MG/DL (ref 0.7–1.3)
DIFFERENTIAL METHOD BLD: ABNORMAL
EOSINOPHIL # BLD: 0.3 K/UL (ref 0–0.4)
EOSINOPHIL NFR BLD: 5 % (ref 0–7)
ERYTHROCYTE [DISTWIDTH] IN BLOOD BY AUTOMATED COUNT: 14.5 % (ref 11.5–14.5)
GLOBULIN SER CALC-MCNC: 2.5 G/DL (ref 2–4)
GLUCOSE SERPL-MCNC: 110 MG/DL (ref 65–100)
HCT VFR BLD AUTO: 42.6 % (ref 36.6–50.3)
HGB BLD-MCNC: 14.1 G/DL (ref 12.1–17)
IMM GRANULOCYTES # BLD AUTO: 0 K/UL (ref 0–0.04)
IMM GRANULOCYTES NFR BLD AUTO: 0 % (ref 0–0.5)
LYMPHOCYTES # BLD: 1 K/UL (ref 0.8–3.5)
LYMPHOCYTES NFR BLD: 16 % (ref 12–49)
MCH RBC QN AUTO: 31.4 PG (ref 26–34)
MCHC RBC AUTO-ENTMCNC: 33.1 G/DL (ref 30–36.5)
MCV RBC AUTO: 94.9 FL (ref 80–99)
MONOCYTES # BLD: 0.6 K/UL (ref 0–1)
MONOCYTES NFR BLD: 9 % (ref 5–13)
NEUTS SEG # BLD: 4.4 K/UL (ref 1.8–8)
NEUTS SEG NFR BLD: 69 % (ref 32–75)
NRBC # BLD: 0 K/UL (ref 0–0.01)
NRBC BLD-RTO: 0 PER 100 WBC
PLATELET # BLD AUTO: 121 K/UL (ref 150–400)
PMV BLD AUTO: 10.8 FL (ref 8.9–12.9)
POTASSIUM SERPL-SCNC: 4.2 MMOL/L (ref 3.5–5.1)
PROT SERPL-MCNC: 6.6 G/DL (ref 6.4–8.2)
RBC # BLD AUTO: 4.49 M/UL (ref 4.1–5.7)
SODIUM SERPL-SCNC: 139 MMOL/L (ref 136–145)
SPECIMEN EXP DATE BLD: NORMAL
WBC # BLD AUTO: 6.3 K/UL (ref 4.1–11.1)

## 2024-12-20 PROCEDURE — 85025 COMPLETE CBC W/AUTO DIFF WBC: CPT

## 2024-12-20 PROCEDURE — 80053 COMPREHEN METABOLIC PANEL: CPT

## 2024-12-20 PROCEDURE — 93005 ELECTROCARDIOGRAM TRACING: CPT | Performed by: INTERNAL MEDICINE

## 2024-12-20 PROCEDURE — 86850 RBC ANTIBODY SCREEN: CPT

## 2024-12-20 PROCEDURE — 36415 COLL VENOUS BLD VENIPUNCTURE: CPT

## 2024-12-20 PROCEDURE — 86900 BLOOD TYPING SEROLOGIC ABO: CPT

## 2024-12-20 PROCEDURE — 86901 BLOOD TYPING SEROLOGIC RH(D): CPT

## 2024-12-20 RX ORDER — METOPROLOL SUCCINATE 25 MG/1
25 TABLET, EXTENDED RELEASE ORAL DAILY
COMMUNITY

## 2024-12-20 RX ORDER — DIMETHYL FUMARATE 240 MG/1
240 CAPSULE ORAL 2 TIMES DAILY
COMMUNITY

## 2024-12-20 RX ORDER — SACUBITRIL AND VALSARTAN 49; 51 MG/1; MG/1
1 TABLET, FILM COATED ORAL 2 TIMES DAILY
COMMUNITY

## 2024-12-20 RX ORDER — FUROSEMIDE 40 MG/1
40 TABLET ORAL DAILY
COMMUNITY

## 2024-12-20 RX ORDER — DABIGATRAN ETEXILATE 150 MG/1
150 CAPSULE ORAL 2 TIMES DAILY
COMMUNITY

## 2024-12-20 RX ORDER — SPIRONOLACTONE 25 MG/1
25 TABLET ORAL DAILY
COMMUNITY

## 2024-12-20 RX ORDER — POLYETHYLENE GLYCOL 3350 17 G/17G
17 POWDER, FOR SOLUTION ORAL DAILY
COMMUNITY

## 2024-12-20 RX ORDER — AMIODARONE HYDROCHLORIDE 200 MG/1
200 TABLET ORAL DAILY
COMMUNITY

## 2024-12-20 NOTE — PERIOP NOTE
Dr. Noonan's office called spoke with Aide she verified no PT/PTT needed/required during PAT for procedure scheduled for 12/30/24.

## 2024-12-20 NOTE — PERIOP NOTE
Dr. Noonan's office called spoke with Natividad made her aware of patient's abnormal labs CBC-- Platelets 121 and CMP-- BUN 28. Natividad stated she will make Dr. Noonan aware of abnormal labs and instruct him to call back to PAT with any further concerns/orders if needed.       PT/PTT ordered to be done DOS per anesthesia protocol due to Platelets less than 125.

## 2024-12-21 LAB
EKG ATRIAL RATE: 90 BPM
EKG DIAGNOSIS: NORMAL
EKG Q-T INTERVAL: 420 MS
EKG QRS DURATION: 112 MS
EKG QTC CALCULATION (BAZETT): 394 MS
EKG R AXIS: -48 DEGREES
EKG T AXIS: 86 DEGREES
EKG VENTRICULAR RATE: 53 BPM

## 2024-12-29 ENCOUNTER — ANESTHESIA EVENT (OUTPATIENT)
Facility: HOSPITAL | Age: 69
End: 2024-12-29
Payer: MEDICARE

## 2024-12-30 ENCOUNTER — ANESTHESIA (OUTPATIENT)
Facility: HOSPITAL | Age: 69
End: 2024-12-30
Payer: MEDICARE

## 2024-12-30 ENCOUNTER — HOSPITAL ENCOUNTER (OUTPATIENT)
Facility: HOSPITAL | Age: 69
Discharge: HOME | End: 2024-12-30
Attending: INTERNAL MEDICINE | Admitting: INTERNAL MEDICINE
Payer: MEDICARE

## 2024-12-30 VITALS
RESPIRATION RATE: 20 BRPM | OXYGEN SATURATION: 94 % | SYSTOLIC BLOOD PRESSURE: 103 MMHG | DIASTOLIC BLOOD PRESSURE: 56 MMHG | HEART RATE: 76 BPM | TEMPERATURE: 98.1 F

## 2024-12-30 DIAGNOSIS — I48.91 ATRIAL FIBRILLATION (HCC): ICD-10-CM

## 2024-12-30 LAB
ACT BLD: 302 SEC (ref 74–125)
ACT BLD: 389 SEC (ref 74–125)
ANION GAP BLD CALC-SCNC: 14
APTT PPP: 61.5 SEC (ref 21.2–34.1)
CA-I BLD-MCNC: 1.15 MMOL/L (ref 1.12–1.32)
CHLORIDE BLD-SCNC: 103 MMOL/L (ref 98–107)
CO2 BLD-SCNC: 24 MMOL/L
CREAT UR-MCNC: 0.83 MG/DL (ref 0.6–1.3)
ERYTHROCYTE [DISTWIDTH] IN BLOOD BY AUTOMATED COUNT: 13.9 % (ref 11.5–14.5)
GLUCOSE BLD STRIP.AUTO-MCNC: 110 MG/DL (ref 65–100)
HCT VFR BLD AUTO: 40.9 % (ref 36.6–50.3)
HGB BLD-MCNC: 13.8 G/DL (ref 12.1–17)
INR PPP: 1.4 (ref 0.9–1.1)
MCH RBC QN AUTO: 31.5 PG (ref 26–34)
MCHC RBC AUTO-ENTMCNC: 33.7 G/DL (ref 30–36.5)
MCV RBC AUTO: 93.4 FL (ref 80–99)
NRBC # BLD: 0 K/UL (ref 0–0.01)
NRBC BLD-RTO: 0 PER 100 WBC
PERFORMED BY:: ABNORMAL
PERFORMED BY:: ABNORMAL
PLATELET # BLD AUTO: 114 K/UL (ref 150–400)
PMV BLD AUTO: 10.2 FL (ref 8.9–12.9)
POTASSIUM BLD-SCNC: 4.3 MMOL/L (ref 3.5–5.5)
PROTHROMBIN TIME: 17.1 SEC (ref 11.9–14.6)
RBC # BLD AUTO: 4.38 M/UL (ref 4.1–5.7)
SODIUM BLD-SCNC: 140 MMOL/L (ref 136–145)
THERAPEUTIC RANGE: ABNORMAL SEC (ref 82–109)
WBC # BLD AUTO: 7.5 K/UL (ref 4.1–11.1)

## 2024-12-30 PROCEDURE — 36415 COLL VENOUS BLD VENIPUNCTURE: CPT

## 2024-12-30 PROCEDURE — 6360000002 HC RX W HCPCS: Performed by: ANESTHESIOLOGY

## 2024-12-30 PROCEDURE — 2709999900 HC NON-CHARGEABLE SUPPLY: Performed by: INTERNAL MEDICINE

## 2024-12-30 PROCEDURE — C1759 CATH, INTRA ECHOCARDIOGRAPHY: HCPCS | Performed by: INTERNAL MEDICINE

## 2024-12-30 PROCEDURE — 85610 PROTHROMBIN TIME: CPT

## 2024-12-30 PROCEDURE — C1730 CATH, EP, 19 OR FEW ELECT: HCPCS | Performed by: INTERNAL MEDICINE

## 2024-12-30 PROCEDURE — 85730 THROMBOPLASTIN TIME PARTIAL: CPT

## 2024-12-30 PROCEDURE — 85027 COMPLETE CBC AUTOMATED: CPT

## 2024-12-30 PROCEDURE — 2580000003 HC RX 258: Performed by: INTERNAL MEDICINE

## 2024-12-30 PROCEDURE — 80047 BASIC METABLC PNL IONIZED CA: CPT

## 2024-12-30 PROCEDURE — 3700000001 HC ADD 15 MINUTES (ANESTHESIA): Performed by: INTERNAL MEDICINE

## 2024-12-30 PROCEDURE — 93613 INTRACARDIAC EPHYS 3D MAPG: CPT | Performed by: INTERNAL MEDICINE

## 2024-12-30 PROCEDURE — C1732 CATH, EP, DIAG/ABL, 3D/VECT: HCPCS | Performed by: INTERNAL MEDICINE

## 2024-12-30 PROCEDURE — C1766 INTRO/SHEATH,STRBLE,NON-PEEL: HCPCS | Performed by: INTERNAL MEDICINE

## 2024-12-30 PROCEDURE — 7100000011 HC PHASE II RECOVERY - ADDTL 15 MIN: Performed by: INTERNAL MEDICINE

## 2024-12-30 PROCEDURE — 2500000003 HC RX 250 WO HCPCS

## 2024-12-30 PROCEDURE — 3700000000 HC ANESTHESIA ATTENDED CARE: Performed by: INTERNAL MEDICINE

## 2024-12-30 PROCEDURE — 6360000002 HC RX W HCPCS

## 2024-12-30 PROCEDURE — 6360000002 HC RX W HCPCS: Performed by: INTERNAL MEDICINE

## 2024-12-30 PROCEDURE — C1893 INTRO/SHEATH, FIXED,NON-PEEL: HCPCS | Performed by: INTERNAL MEDICINE

## 2024-12-30 PROCEDURE — C1760 CLOSURE DEV, VASC: HCPCS | Performed by: INTERNAL MEDICINE

## 2024-12-30 PROCEDURE — 2580000003 HC RX 258

## 2024-12-30 PROCEDURE — 93609 INTRA-VNTR MAPG TCHYCAR SITE: CPT | Performed by: INTERNAL MEDICINE

## 2024-12-30 PROCEDURE — C1894 INTRO/SHEATH, NON-LASER: HCPCS | Performed by: INTERNAL MEDICINE

## 2024-12-30 PROCEDURE — 85347 COAGULATION TIME ACTIVATED: CPT

## 2024-12-30 PROCEDURE — 93656 COMPRE EP EVAL ABLTJ ATR FIB: CPT | Performed by: INTERNAL MEDICINE

## 2024-12-30 PROCEDURE — 6370000000 HC RX 637 (ALT 250 FOR IP): Performed by: INTERNAL MEDICINE

## 2024-12-30 PROCEDURE — 2500000003 HC RX 250 WO HCPCS: Performed by: ANESTHESIOLOGY

## 2024-12-30 PROCEDURE — 7100000010 HC PHASE II RECOVERY - FIRST 15 MIN: Performed by: INTERNAL MEDICINE

## 2024-12-30 PROCEDURE — 93623 PRGRMD STIMJ&PACG IV RX NFS: CPT | Performed by: INTERNAL MEDICINE

## 2024-12-30 PROCEDURE — 76937 US GUIDE VASCULAR ACCESS: CPT | Performed by: INTERNAL MEDICINE

## 2024-12-30 PROCEDURE — 7100000000 HC PACU RECOVERY - FIRST 15 MIN: Performed by: INTERNAL MEDICINE

## 2024-12-30 PROCEDURE — 2720000010 HC SURG SUPPLY STERILE: Performed by: INTERNAL MEDICINE

## 2024-12-30 PROCEDURE — C1769 GUIDE WIRE: HCPCS | Performed by: INTERNAL MEDICINE

## 2024-12-30 PROCEDURE — 93655 ICAR CATH ABLTJ DSCRT ARRHYT: CPT | Performed by: INTERNAL MEDICINE

## 2024-12-30 PROCEDURE — 7100000001 HC PACU RECOVERY - ADDTL 15 MIN: Performed by: INTERNAL MEDICINE

## 2024-12-30 RX ORDER — PROTAMINE SULFATE 10 MG/ML
INJECTION, SOLUTION INTRAVENOUS
Status: DISCONTINUED | OUTPATIENT
Start: 2024-12-30 | End: 2024-12-30 | Stop reason: SDUPTHER

## 2024-12-30 RX ORDER — SODIUM CHLORIDE 9 MG/ML
INJECTION, SOLUTION INTRAVENOUS CONTINUOUS
Status: DISCONTINUED | OUTPATIENT
Start: 2024-12-30 | End: 2024-12-30 | Stop reason: HOSPADM

## 2024-12-30 RX ORDER — KETOROLAC TROMETHAMINE 30 MG/ML
INJECTION, SOLUTION INTRAMUSCULAR; INTRAVENOUS
Status: COMPLETED
Start: 2024-12-30 | End: 2024-12-30

## 2024-12-30 RX ORDER — SODIUM CHLORIDE 0.9 % (FLUSH) 0.9 %
5-40 SYRINGE (ML) INJECTION EVERY 12 HOURS SCHEDULED
Status: DISCONTINUED | OUTPATIENT
Start: 2024-12-30 | End: 2024-12-30 | Stop reason: HOSPADM

## 2024-12-30 RX ORDER — PHENYLEPHRINE HCL IN 0.9% NACL 1 MG/10 ML
SYRINGE (ML) INTRAVENOUS
Status: DISCONTINUED | OUTPATIENT
Start: 2024-12-30 | End: 2024-12-30 | Stop reason: SDUPTHER

## 2024-12-30 RX ORDER — GLUCAGON 1 MG/ML
1 KIT INJECTION PRN
Status: DISCONTINUED | OUTPATIENT
Start: 2024-12-30 | End: 2024-12-30 | Stop reason: HOSPADM

## 2024-12-30 RX ORDER — METOCLOPRAMIDE HYDROCHLORIDE 5 MG/ML
10 INJECTION INTRAMUSCULAR; INTRAVENOUS
Status: DISCONTINUED | OUTPATIENT
Start: 2024-12-30 | End: 2024-12-30 | Stop reason: HOSPADM

## 2024-12-30 RX ORDER — NALOXONE HYDROCHLORIDE 0.4 MG/ML
INJECTION, SOLUTION INTRAMUSCULAR; INTRAVENOUS; SUBCUTANEOUS PRN
Status: DISCONTINUED | OUTPATIENT
Start: 2024-12-30 | End: 2024-12-30 | Stop reason: HOSPADM

## 2024-12-30 RX ORDER — SODIUM CHLORIDE, SODIUM LACTATE, POTASSIUM CHLORIDE, CALCIUM CHLORIDE 600; 310; 30; 20 MG/100ML; MG/100ML; MG/100ML; MG/100ML
INJECTION, SOLUTION INTRAVENOUS
Status: DISCONTINUED | OUTPATIENT
Start: 2024-12-30 | End: 2024-12-30 | Stop reason: SDUPTHER

## 2024-12-30 RX ORDER — HEPARIN SODIUM 200 [USP'U]/100ML
INJECTION, SOLUTION INTRAVENOUS CONTINUOUS PRN
Status: COMPLETED | OUTPATIENT
Start: 2024-12-30 | End: 2024-12-30

## 2024-12-30 RX ORDER — HEPARIN SODIUM 200 [USP'U]/100ML
INJECTION, SOLUTION INTRAVENOUS
Status: DISCONTINUED | OUTPATIENT
Start: 2024-12-30 | End: 2024-12-30 | Stop reason: SDUPTHER

## 2024-12-30 RX ORDER — OXYCODONE HYDROCHLORIDE 5 MG/1
5 TABLET ORAL PRN
Status: DISCONTINUED | OUTPATIENT
Start: 2024-12-30 | End: 2024-12-30 | Stop reason: HOSPADM

## 2024-12-30 RX ORDER — DEXMEDETOMIDINE HYDROCHLORIDE 100 UG/ML
INJECTION, SOLUTION INTRAVENOUS
Status: DISCONTINUED | OUTPATIENT
Start: 2024-12-30 | End: 2024-12-30 | Stop reason: SDUPTHER

## 2024-12-30 RX ORDER — FENTANYL CITRATE 50 UG/ML
INJECTION, SOLUTION INTRAMUSCULAR; INTRAVENOUS
Status: DISCONTINUED | OUTPATIENT
Start: 2024-12-30 | End: 2024-12-30 | Stop reason: SDUPTHER

## 2024-12-30 RX ORDER — LABETALOL HYDROCHLORIDE 5 MG/ML
10 INJECTION, SOLUTION INTRAVENOUS
Status: DISCONTINUED | OUTPATIENT
Start: 2024-12-30 | End: 2024-12-30 | Stop reason: HOSPADM

## 2024-12-30 RX ORDER — MEPERIDINE HYDROCHLORIDE 25 MG/ML
12.5 INJECTION INTRAMUSCULAR; INTRAVENOUS; SUBCUTANEOUS EVERY 5 MIN PRN
Status: DISCONTINUED | OUTPATIENT
Start: 2024-12-30 | End: 2024-12-30 | Stop reason: HOSPADM

## 2024-12-30 RX ORDER — ONDANSETRON 2 MG/ML
4 INJECTION INTRAMUSCULAR; INTRAVENOUS
Status: DISCONTINUED | OUTPATIENT
Start: 2024-12-30 | End: 2024-12-30 | Stop reason: HOSPADM

## 2024-12-30 RX ORDER — FENTANYL CITRATE 0.05 MG/ML
50 INJECTION, SOLUTION INTRAMUSCULAR; INTRAVENOUS EVERY 5 MIN PRN
Status: DISCONTINUED | OUTPATIENT
Start: 2024-12-30 | End: 2024-12-30 | Stop reason: HOSPADM

## 2024-12-30 RX ORDER — SODIUM CHLORIDE 9 MG/ML
INJECTION, SOLUTION INTRAVENOUS PRN
Status: DISCONTINUED | OUTPATIENT
Start: 2024-12-30 | End: 2024-12-30 | Stop reason: HOSPADM

## 2024-12-30 RX ORDER — ONDANSETRON 2 MG/ML
INJECTION INTRAMUSCULAR; INTRAVENOUS
Status: DISCONTINUED | OUTPATIENT
Start: 2024-12-30 | End: 2024-12-30 | Stop reason: SDUPTHER

## 2024-12-30 RX ORDER — LIDOCAINE HYDROCHLORIDE 20 MG/ML
INJECTION, SOLUTION EPIDURAL; INFILTRATION; INTRACAUDAL; PERINEURAL
Status: DISCONTINUED | OUTPATIENT
Start: 2024-12-30 | End: 2024-12-30 | Stop reason: SDUPTHER

## 2024-12-30 RX ORDER — LORAZEPAM 2 MG/ML
0.5 INJECTION INTRAMUSCULAR
Status: DISCONTINUED | OUTPATIENT
Start: 2024-12-30 | End: 2024-12-30 | Stop reason: HOSPADM

## 2024-12-30 RX ORDER — DEXTROSE MONOHYDRATE 100 MG/ML
INJECTION, SOLUTION INTRAVENOUS CONTINUOUS PRN
Status: DISCONTINUED | OUTPATIENT
Start: 2024-12-30 | End: 2024-12-30 | Stop reason: HOSPADM

## 2024-12-30 RX ORDER — LIDOCAINE 4 G/G
1 PATCH TOPICAL AS NEEDED
Status: DISCONTINUED | OUTPATIENT
Start: 2024-12-30 | End: 2024-12-30 | Stop reason: HOSPADM

## 2024-12-30 RX ORDER — OXYCODONE HYDROCHLORIDE 5 MG/1
10 TABLET ORAL PRN
Status: DISCONTINUED | OUTPATIENT
Start: 2024-12-30 | End: 2024-12-30 | Stop reason: HOSPADM

## 2024-12-30 RX ORDER — ATORVASTATIN CALCIUM 40 MG/1
20 TABLET, FILM COATED ORAL DAILY
Qty: 90 TABLET | Refills: 3 | Status: SHIPPED | OUTPATIENT
Start: 2024-12-30

## 2024-12-30 RX ORDER — DIPHENHYDRAMINE HYDROCHLORIDE 50 MG/ML
12.5 INJECTION INTRAMUSCULAR; INTRAVENOUS
Status: DISCONTINUED | OUTPATIENT
Start: 2024-12-30 | End: 2024-12-30 | Stop reason: HOSPADM

## 2024-12-30 RX ORDER — HYDROMORPHONE HYDROCHLORIDE 1 MG/ML
0.5 INJECTION, SOLUTION INTRAMUSCULAR; INTRAVENOUS; SUBCUTANEOUS EVERY 5 MIN PRN
Status: DISCONTINUED | OUTPATIENT
Start: 2024-12-30 | End: 2024-12-30 | Stop reason: HOSPADM

## 2024-12-30 RX ORDER — KETOROLAC TROMETHAMINE 15 MG/ML
15 INJECTION, SOLUTION INTRAMUSCULAR; INTRAVENOUS
Status: COMPLETED | OUTPATIENT
Start: 2024-12-30 | End: 2024-12-30

## 2024-12-30 RX ORDER — SODIUM CHLORIDE 0.9 % (FLUSH) 0.9 %
5-40 SYRINGE (ML) INJECTION PRN
Status: DISCONTINUED | OUTPATIENT
Start: 2024-12-30 | End: 2024-12-30 | Stop reason: HOSPADM

## 2024-12-30 RX ORDER — SODIUM CHLORIDE, SODIUM LACTATE, POTASSIUM CHLORIDE, CALCIUM CHLORIDE 600; 310; 30; 20 MG/100ML; MG/100ML; MG/100ML; MG/100ML
INJECTION, SOLUTION INTRAVENOUS ONCE
Status: DISCONTINUED | OUTPATIENT
Start: 2024-12-30 | End: 2024-12-30 | Stop reason: HOSPADM

## 2024-12-30 RX ORDER — IPRATROPIUM BROMIDE AND ALBUTEROL SULFATE 2.5; .5 MG/3ML; MG/3ML
1 SOLUTION RESPIRATORY (INHALATION)
Status: DISCONTINUED | OUTPATIENT
Start: 2024-12-30 | End: 2024-12-30 | Stop reason: HOSPADM

## 2024-12-30 RX ORDER — HYDRALAZINE HYDROCHLORIDE 20 MG/ML
10 INJECTION INTRAMUSCULAR; INTRAVENOUS
Status: DISCONTINUED | OUTPATIENT
Start: 2024-12-30 | End: 2024-12-30 | Stop reason: HOSPADM

## 2024-12-30 RX ORDER — SUCCINYLCHOLINE/SOD CL,ISO/PF 200MG/10ML
SYRINGE (ML) INTRAVENOUS
Status: DISCONTINUED | OUTPATIENT
Start: 2024-12-30 | End: 2024-12-30 | Stop reason: SDUPTHER

## 2024-12-30 RX ORDER — COLCHICINE 0.6 MG/1
0.6 TABLET ORAL ONCE
Status: COMPLETED | OUTPATIENT
Start: 2024-12-30 | End: 2024-12-30

## 2024-12-30 RX ORDER — DEXAMETHASONE SODIUM PHOSPHATE 4 MG/ML
INJECTION, SOLUTION INTRA-ARTICULAR; INTRALESIONAL; INTRAMUSCULAR; INTRAVENOUS; SOFT TISSUE
Status: DISCONTINUED | OUTPATIENT
Start: 2024-12-30 | End: 2024-12-30 | Stop reason: SDUPTHER

## 2024-12-30 RX ORDER — PANTOPRAZOLE SODIUM 40 MG/1
40 TABLET, DELAYED RELEASE ORAL
Qty: 30 TABLET | Refills: 0 | Status: SHIPPED | OUTPATIENT
Start: 2024-12-30

## 2024-12-30 RX ORDER — EPHEDRINE SULFATE 50 MG/ML
INJECTION INTRAVENOUS
Status: DISCONTINUED | OUTPATIENT
Start: 2024-12-30 | End: 2024-12-30 | Stop reason: SDUPTHER

## 2024-12-30 RX ORDER — PROPOFOL 10 MG/ML
INJECTION, EMULSION INTRAVENOUS
Status: DISCONTINUED | OUTPATIENT
Start: 2024-12-30 | End: 2024-12-30 | Stop reason: SDUPTHER

## 2024-12-30 RX ADMIN — Medication 200 MCG: at 13:40

## 2024-12-30 RX ADMIN — PROPOFOL 180 MG: 10 INJECTION, EMULSION INTRAVENOUS at 13:02

## 2024-12-30 RX ADMIN — SODIUM CHLORIDE: 9 INJECTION, SOLUTION INTRAVENOUS at 09:33

## 2024-12-30 RX ADMIN — DEXMEDETOMIDINE 10 MCG: 100 INJECTION, SOLUTION INTRAVENOUS at 15:06

## 2024-12-30 RX ADMIN — ONDANSETRON 4 MG: 2 INJECTION INTRAMUSCULAR; INTRAVENOUS at 14:37

## 2024-12-30 RX ADMIN — Medication 180 MG: at 13:02

## 2024-12-30 RX ADMIN — EPHEDRINE SULFATE 10 MG: 50 INJECTION INTRAVENOUS at 13:17

## 2024-12-30 RX ADMIN — DEXAMETHASONE SODIUM PHOSPHATE 8 MG: 4 INJECTION, SOLUTION INTRA-ARTICULAR; INTRALESIONAL; INTRAMUSCULAR; INTRAVENOUS; SOFT TISSUE at 13:19

## 2024-12-30 RX ADMIN — KETOROLAC TROMETHAMINE 15 MG: 30 INJECTION, SOLUTION INTRAMUSCULAR; INTRAVENOUS at 15:55

## 2024-12-30 RX ADMIN — SODIUM CHLORIDE, POTASSIUM CHLORIDE, SODIUM LACTATE AND CALCIUM CHLORIDE: 600; 310; 30; 20 INJECTION, SOLUTION INTRAVENOUS at 12:53

## 2024-12-30 RX ADMIN — LIDOCAINE HYDROCHLORIDE 100 MG: 20 SOLUTION INTRAVENOUS at 15:06

## 2024-12-30 RX ADMIN — Medication 200 MCG: at 14:32

## 2024-12-30 RX ADMIN — Medication 200 MCG: at 13:50

## 2024-12-30 RX ADMIN — Medication 200 MCG: at 13:25

## 2024-12-30 RX ADMIN — EPHEDRINE SULFATE 10 MG: 50 INJECTION INTRAVENOUS at 13:14

## 2024-12-30 RX ADMIN — KETOROLAC TROMETHAMINE 15 MG: 15 INJECTION, SOLUTION INTRAMUSCULAR; INTRAVENOUS at 16:06

## 2024-12-30 RX ADMIN — HEPARIN SODIUM 15000 UNITS: 200 INJECTION, SOLUTION INTRAVENOUS at 13:24

## 2024-12-30 RX ADMIN — COLCHICINE 0.6 MG: 0.6 TABLET ORAL at 16:57

## 2024-12-30 RX ADMIN — Medication 200 MCG: at 13:30

## 2024-12-30 RX ADMIN — FENTANYL CITRATE 100 MCG: 50 INJECTION INTRAMUSCULAR; INTRAVENOUS at 13:02

## 2024-12-30 RX ADMIN — PROTAMINE SULFATE 50 MG: 10 INJECTION, SOLUTION INTRAVENOUS at 14:58

## 2024-12-30 ASSESSMENT — PAIN - FUNCTIONAL ASSESSMENT
PAIN_FUNCTIONAL_ASSESSMENT: NONE - DENIES PAIN
PAIN_FUNCTIONAL_ASSESSMENT: 0-10
PAIN_FUNCTIONAL_ASSESSMENT: 0-10

## 2024-12-30 ASSESSMENT — PAIN SCALES - GENERAL
PAINLEVEL_OUTOF10: 0

## 2024-12-30 NOTE — PROGRESS NOTES
IV DC'D FROM RIGHT HAND , NO SWELLING NOTED , DISCHARGE INSTRUCTIONS GIVEN TO PT AND PT'S WIFE MARGARET , BOTH VERBALIZED UNDERSTANDING , NO DISTRESS NOTED

## 2024-12-30 NOTE — PROGRESS NOTES
PT AMBULATED IN HALLWAY , AND TO BR , TOLERATED WELL , DRESSING RIGHT GROIN HAS SMALL AMT BLEEDING NOTED OUTSIDE OF AREA CIRCLED IN PACU , DRESSING CHANGED , NO ACTIVE BLEEDING NOTED , QUICK CLOT APPLIED , DRESSING LEFT GROIN DRY AND INTACT , IV DC'D FROM LEFT HAND , NO SWELLING NOTED

## 2024-12-30 NOTE — ANESTHESIA PRE PROCEDURE
Department of Anesthesiology  Preprocedure Note       Name:  Hipolito Hernandez Jr.   Age:  69 y.o.  :  1955                                          MRN:  [id number]         Date:  2024      Surgeon: Surgeon(s):  Alden Noonan MD    Procedure: Procedure(s):  Ablation A-fib w complete ep study    Medications prior to admission:   Prior to Admission medications    Medication Sig Start Date End Date Taking? Authorizing Provider   sacubitril-valsartan (ENTRESTO) 49-51 MG per tablet Take 1 tablet by mouth 2 times daily   Yes Josue Holt MD   dimethyl fumarate (TECFIDERA) 240 MG delayed release capsule Take 1 capsule by mouth 2 times daily   Yes Josue Holt MD   furosemide (LASIX) 40 MG tablet Take 1 tablet by mouth daily   Yes ProviderJosue MD   spironolactone (ALDACTONE) 25 MG tablet Take 1 tablet by mouth daily   Yes Josue Holt MD   dabigatran (PRADAXA) 150 MG capsule Take 1 capsule by mouth 2 times daily   Yes ProviderJosue MD   VITAMIN D PO Take 1 capsule by mouth daily (with breakfast)   Yes ProviderJosue MD   Pyridoxine HCl (VITAMIN B-6 PO) Take 1 capsule by mouth daily (with breakfast)   Yes ProviderJosue MD   amiodarone (CORDARONE) 200 MG tablet Take 1 tablet by mouth daily   Yes ProviderJosue MD   polyethylene glycol (GLYCOLAX) 17 g packet Take 1 packet by mouth daily   Yes ProviderJosue MD   metoprolol succinate (TOPROL XL) 25 MG extended release tablet Take 1 tablet by mouth daily   Yes Josue Holt MD   Multiple Vitamins-Minerals (MULTIVITAMIN ADULTS PO) Take 1 tablet by mouth daily (with breakfast)   Yes ProviderJosue MD   atorvastatin (LIPITOR) 40 MG tablet Take 1 tablet by mouth daily   Yes Automatic Reconciliation, Ar   sacubitril-valsartan (ENTRESTO) 24-26 MG per tablet Take 1 tablet by mouth 2 times daily  Patient not taking: Reported on 2024    Josue Holt MD   Probiotic Product

## 2024-12-30 NOTE — ANESTHESIA POSTPROCEDURE EVALUATION
Department of Anesthesiology  Postprocedure Note    Patient: Hipolito Hernandez Jr.  MRN: [id number]  YOB: 1955  Date of evaluation: 12/30/2024    Procedure Summary       Date: 12/30/24 Room / Location: [facility] EP LAB / [facility] ELECTROPHYSIOLOGY    Anesthesia Start: 1246 Anesthesia Stop: 1535    Procedures:       Ablation A-fib w complete ep study      Ultrasound guided vascular access      Intracardiac echocardiogram      Ep 3d mapping      Catheter mapping tachycardia      Drug stimulation      Ablation following A-fib addl Diagnosis:       Atrial fibrillation (HCC)      (Atrial fibrillation (HCC) [I48.91])    Providers: Alden Noonan MD Responsible Provider: Fuad Jeffrey MD    Anesthesia Type: General ASA Status: 4            Anesthesia Type: General    Eliza Phase I: Eliza Score: 8    Eliza Phase II:      Anesthesia Post Evaluation    Patient location during evaluation: PACU  Patient participation: complete - patient participated  Level of consciousness: sleepy but conscious  Pain score: 0  Airway patency: patent  Nausea & Vomiting: no nausea and no vomiting  Cardiovascular status: hemodynamically stable  Respiratory status: acceptable  Hydration status: stable  Multimodal analgesia pain management approach    There were no known notable events for this encounter.

## 2024-12-30 NOTE — DISCHARGE INSTRUCTIONS
date of the procedure.  If you experience symptoms of a stroke (numbness or weakness in any area of the body, difficulty speaking, visual changes), please go directly to the emergency room and notify them of your recent procedure.   Chest pain is common after an atrial fibrillation ablation. Chest pain is typically from inflammation of the lining around the heart, called pericarditis. The pain is often sharp and worse with lying down or taking a deep breath. If you experience this, please take an anti-inflammatory medication (see below) and contact our office. If your pain is severe, go to the emergency room.  The pain will typically subside in 2-7 days. Chest pain which starts after the first 7 days after the procedure is cause for concern (see #10)  If you experience mild chest pain after the first 7 days after the procedure, please call our office.  If the chest pain is moderate or severe, go to the emergency room and notify them of your recent procedure.  If you experience mild shortness of breath, please contact our office.  If these symptoms are moderate to severe, go to the emergency room and notify them of your recent procedure.  If you experience vomiting of blood, severe chest pain, stroke symptoms or high fevers (greater than 101F) for unclear reasons in the first 6 weeks after the procedure, please go directly to the emergency room and notify them of your recent procedure and inform them to evaluate you for an atrioesophageal fistula with a CT scan of the chest with IV and/or oral contrast.    If you experience difficulty or pain while swallowing, please contact our office.    DIET  You may resume your usual diet after discharge from the hospital    ACTIVITY  Unless otherwise specified, normal activities may be resumed the day after the procedure, with the following exceptions:  No driving or sexual intercourse for the next 48 hours.  No heavy lifting (greater than 10 pounds) or strenuous exercise for

## 2025-04-18 ENCOUNTER — HOSPITAL ENCOUNTER (OUTPATIENT)
Facility: HOSPITAL | Age: 70
Setting detail: RECURRING SERIES
Discharge: HOME | End: 2025-04-21
Payer: MEDICARE

## 2025-04-18 PROCEDURE — 97112 NEUROMUSCULAR REEDUCATION: CPT | Performed by: PHYSICAL THERAPIST

## 2025-04-18 PROCEDURE — 97162 PT EVAL MOD COMPLEX 30 MIN: CPT | Performed by: PHYSICAL THERAPIST

## 2025-04-18 PROCEDURE — 97110 THERAPEUTIC EXERCISES: CPT | Performed by: PHYSICAL THERAPIST

## 2025-04-18 NOTE — THERAPY EVALUATION
Physical Therapy at [facility],   a part of [address], [address]  [place] [address]  Phone: [phone]  Fax: [phone]       PHYSICAL THERAPY - MEDICARE EVALUATION/PLAN OF CARE NOTE (updated 3/23)      Date: 2025          Patient Name:  Hipolito Hernandez Jr. :  1955   Medical   Diagnosis:  Right leg pain [M79.604]  Right foot pain [M79.671] Treatment Diagnosis:  M79.661  Pain in right lower leg and M79.671  RIGHT FOOT PAIN    Referral Source:  Reyes, Jose S, MD Provider #:  [id number]                Insurance: Payor: [facility] MEDICARE / Plan: [facility] [employer]EEP[employer] MEDIBLUE PLUS / Product Type: *No Product type* /      Patient  verified yes     Visit #   Current  / Total 1 24   Time   In / Out 10:35 AM 11:35 AM   Total Treatment Time 60   Total Timed Codes 25   1:1 Treatment Time 25    [facility] Totals Reminder:  bill using total billable   min of TIMED therapeutic procedures and modalities.   8-22 min = 1 unit; 23-37 min = 2 units; 38-52 min = 3 units;  53-67 min = 4 units; 68-82 min = 5 units           SUBJECTIVE  Pain Level (0-10 scale): 5/10  []constant []intermittent []improving []worsening []no change since onset    Any medication changes, allergies to medications, adverse drug reactions, diagnosis change, or new procedure performed?: [x] No    [] Yes (see summary sheet for update)  Medications: Verified on Patient Summary List    Subjective functional status/changes:     R posterior tibialis tendinopathy, plantar fasciitis    Start of Care: 2025  Onset Date: 6 weeks ago  Current symptoms/Complaints: Pain along the medial lower leg into the arch of the R foot  Mechanism of Injury: A long walk where it gradually came on  PLOF: Regular exercise on a recumbent bike at the gym  Limitations to PLOF/Activity or Recreational Limitations: Unable to advance past mild exercise due to pain at the leg  Work Hx: Retired  Living

## 2025-04-21 ENCOUNTER — HOSPITAL ENCOUNTER (OUTPATIENT)
Facility: HOSPITAL | Age: 70
Setting detail: RECURRING SERIES
Discharge: HOME | End: 2025-04-24
Payer: MEDICARE

## 2025-04-21 PROCEDURE — 97112 NEUROMUSCULAR REEDUCATION: CPT

## 2025-04-21 PROCEDURE — 97110 THERAPEUTIC EXERCISES: CPT

## 2025-04-21 NOTE — PROGRESS NOTES
PHYSICAL THERAPY - MEDICARE DAILY TREATMENT NOTE (updated 3/23)      Date: 2025          Patient Name:  Hipolito Hernandez Jr. :  1955   Medical   Diagnosis:  Right leg pain [M79.604]  Right foot pain [M79.671] Treatment Diagnosis:  M79.661  Pain in right lower leg and M79.671  RIGHT FOOT PAIN    Referral Source:  Reyes, Jose S, MD Insurance:   Payor: [facility] MEDICARE / Plan: [facility] [employer]KEEPERS MEDIBLUE PLUS / Product Type: *No Product type* /                     Patient  verified YES    Visit #   Current  / Total 2 24   Time   In / Out 245p 325p   Total Treatment Time 40   Total Timed Codes 40   1:1 Treatment Time 40      [facility] Totals Reminder:  bill using total billable   min of TIMED therapeutic procedures and modalities.   8-22 min = 1 unit; 23-37 min = 2 units; 38-52 min = 3 units; 53-67 min = 4 units; 68-82 min = 5 units            SUBJECTIVE    Pain Level (0-10 scale): 0/10    Any medication changes, allergies to medications, adverse drug reactions, diagnosis change, or new procedure performed?: [x] No    [] Yes (see summary sheet for update)  Medications: Verified on Patient Summary List    Subjective functional status/changes:     Patient reported that things have been feeling much better with the HEP and working on keeping his right foot straight when walking.    OBJECTIVE      Therapeutic Procedures:  Tx Min Billable or 1:1 Min (if diff from Tx Min) Procedure, Rationale, Specifics   30  03165 Therapeutic Exercise (timed):  increase ROM, strength, coordination, balance, and proprioception to improve patient's ability to progress to PLOF and address remaining functional goals. (see flow sheet as applicable)     Details if applicable:     10  32221 Neuromuscular Re-Education (timed):  improve balance, coordination, kinesthetic sense, posture, core stability and proprioception to improve patient's ability to develop conscious control of individual muscles and awareness of position of

## 2025-04-28 ENCOUNTER — HOSPITAL ENCOUNTER (OUTPATIENT)
Facility: HOSPITAL | Age: 70
Setting detail: RECURRING SERIES
Discharge: HOME | End: 2025-05-01
Payer: MEDICARE

## 2025-04-28 PROCEDURE — 97016 VASOPNEUMATIC DEVICE THERAPY: CPT

## 2025-04-28 PROCEDURE — 97110 THERAPEUTIC EXERCISES: CPT

## 2025-04-28 PROCEDURE — 97112 NEUROMUSCULAR REEDUCATION: CPT

## 2025-04-28 NOTE — PROGRESS NOTES
PHYSICAL THERAPY - MEDICARE DAILY TREATMENT NOTE (updated 3/23)      Date: 2025          Patient Name:  Hipolito Hernandez Jr. :  1955   Medical   Diagnosis:  Right leg pain [M79.604]  Right foot pain [M79.671] Treatment Diagnosis:  M79.661  Pain in right lower leg and M79.671  RIGHT FOOT PAIN    Referral Source:  Reyes, Jose S, MD Insurance:   Payor: [facility] MEDICARE / Plan: [facility] HEALTHKEEPERS MEDIBLUE PLUS / Product Type: *No Product type* /                     Patient  verified YES    Visit #   Current  / Total 3 24   Time   In / Out 200p 235p   Total Treatment Time 35   Total Timed Codes 35   1:1 Treatment Time 35      [facility] Totals Reminder:  bill using total billable   min of TIMED therapeutic procedures and modalities.   8-22 min = 1 unit; 23-37 min = 2 units; 38-52 min = 3 units; 53-67 min = 4 units; 68-82 min = 5 units            SUBJECTIVE    Pain Level (0-10 scale): 3/10    Any medication changes, allergies to medications, adverse drug reactions, diagnosis change, or new procedure performed?: [x] No    [] Yes (see summary sheet for update)  Medications: Verified on Patient Summary List    Subjective functional status/changes:     Patient reported that he had a day or two of no pain present but then walked about in [place] and had an increase in pain present and today's it's still a little sore. Patient stated he didn't sleep much last night due to his CPAP machine messing up.    OBJECTIVE      Therapeutic Procedures:  Tx Min Billable or 1:1 Min (if diff from Tx Min) Procedure, Rationale, Specifics   25  50551 Therapeutic Exercise (timed):  increase ROM, strength, coordination, balance, and proprioception to improve patient's ability to progress to PLOF and address remaining functional goals. (see flow sheet as applicable)     Details if applicable:     10  43340 Neuromuscular Re-Education (timed):  improve balance, coordination, kinesthetic sense, posture, core stability and

## 2025-04-30 ENCOUNTER — HOSPITAL ENCOUNTER (OUTPATIENT)
Facility: HOSPITAL | Age: 70
Setting detail: RECURRING SERIES
Discharge: HOME | End: 2025-05-03
Payer: MEDICARE

## 2025-04-30 PROCEDURE — 97110 THERAPEUTIC EXERCISES: CPT | Performed by: PHYSICAL THERAPIST

## 2025-04-30 PROCEDURE — 97112 NEUROMUSCULAR REEDUCATION: CPT | Performed by: PHYSICAL THERAPIST

## 2025-04-30 NOTE — PROGRESS NOTES
PHYSICAL THERAPY - MEDICARE DAILY TREATMENT NOTE (updated 3/23)      Date: 2025          Patient Name:  Hipolito Hernandez Jr. :  1955   Medical   Diagnosis:  Right leg pain [M79.604]  Right foot pain [M79.671] Treatment Diagnosis:  M79.661  Pain in right lower leg and M79.671  RIGHT FOOT PAIN    Referral Source:  Reyes, Jose S, MD Insurance:   Payor: [facility] MEDICARE / Plan: [facility] [employer] PLUS / Product Type: *No Product type* /                     Patient  verified YES    Visit #   Current  / Total 4 24   Time   In / Out 10:15a 10:55a   Total Treatment Time 40   Total Timed Codes 40   1:1 Treatment Time 40      [facility] Totals Reminder:  bill using total billable   min of TIMED therapeutic procedures and modalities.   8-22 min = 1 unit; 23-37 min = 2 units; 38-52 min = 3 units; 53-67 min = 4 units; 68-82 min = 5 units            SUBJECTIVE    Pain Level (0-10 scale): 5/10    Any medication changes, allergies to medications, adverse drug reactions, diagnosis change, or new procedure performed?: [x] No    [] Yes (see summary sheet for update)  Medications: Verified on Patient Summary List    Subjective functional status/changes:     Patient reports that he has good days and bad days with the R ankle, and today is not a good day.    OBJECTIVE      Therapeutic Procedures:  Tx Min Billable or 1:1 Min (if diff from Tx Min) Procedure, Rationale, Specifics   25  [id number] Therapeutic Exercise (timed):  increase ROM, strength, coordination, balance, and proprioception to improve patient's ability to progress to PLOF and address remaining functional goals. (see flow sheet as applicable)     Details if applicable:     10  24058 Neuromuscular Re-Education (timed):  improve balance, coordination, kinesthetic sense, posture, core stability and proprioception to improve patient's ability to develop conscious control of individual muscles and awareness of position of extremities in order to progress

## 2025-05-06 ENCOUNTER — HOSPITAL ENCOUNTER (OUTPATIENT)
Facility: HOSPITAL | Age: 70
Setting detail: RECURRING SERIES
Discharge: HOME | End: 2025-05-09
Payer: MEDICARE

## 2025-05-06 PROCEDURE — 97112 NEUROMUSCULAR REEDUCATION: CPT

## 2025-05-06 PROCEDURE — 97110 THERAPEUTIC EXERCISES: CPT

## 2025-05-06 NOTE — PROGRESS NOTES
PHYSICAL THERAPY - MEDICARE DAILY TREATMENT NOTE (updated 3/23)      Date: 2025          Patient Name:  Hipolito Hernandez Jr. :  1955   Medical   Diagnosis:  Right leg pain [M79.604]  Right foot pain [M79.671] Treatment Diagnosis:  M79.661  Pain in right lower leg and M79.671  RIGHT FOOT PAIN    Referral Source:  Reyes, Jose S, MD Insurance:   Payor: [facility] MEDICARE / Plan: [facility] [employer] MEDIBLUE PLUS / Product Type: *No Product type* /                     Patient  verified YES    Visit #   Current  / Total 5 24   Time   In / Out 215p 255p   Total Treatment Time 40   Total Timed Codes 40   1:1 Treatment Time 40      [facility] Totals Reminder:  bill using total billable   min of TIMED therapeutic procedures and modalities.   8-22 min = 1 unit; 23-37 min = 2 units; 38-52 min = 3 units; 53-67 min = 4 units; 68-82 min = 5 units            SUBJECTIVE    Pain Level (0-10 scale): 2/10    Any medication changes, allergies to medications, adverse drug reactions, diagnosis change, or new procedure performed?: [x] No    [] Yes (see summary sheet for update)  Medications: Verified on Patient Summary List    Subjective functional status/changes:     Patient reports that today is a good day. Overall feels like PT is helping.    OBJECTIVE      Therapeutic Procedures:  Tx Min Billable or 1:1 Min (if diff from Tx Min) Procedure, Rationale, Specifics   70 [id number] Therapeutic Exercise (timed):  increase ROM, strength, coordination, balance, and proprioception to improve patient's ability to progress to PLOF and address remaining functional goals. (see flow sheet as applicable)     Details if applicable:     10  42555 Neuromuscular Re-Education (timed):  improve balance, coordination, kinesthetic sense, posture, core stability and proprioception to improve patient's ability to develop conscious control of individual muscles and awareness of position of extremities in order to progress to PLOF and address

## 2025-05-08 ENCOUNTER — HOSPITAL ENCOUNTER (OUTPATIENT)
Facility: HOSPITAL | Age: 70
Setting detail: RECURRING SERIES
Discharge: HOME | End: 2025-05-11
Payer: MEDICARE

## 2025-05-08 PROCEDURE — 97110 THERAPEUTIC EXERCISES: CPT | Performed by: PHYSICAL THERAPIST

## 2025-05-08 PROCEDURE — 97112 NEUROMUSCULAR REEDUCATION: CPT | Performed by: PHYSICAL THERAPIST

## 2025-05-08 NOTE — PROGRESS NOTES
PHYSICAL THERAPY - MEDICARE DAILY TREATMENT NOTE (updated 3/23)      Date: 2025          Patient Name:  Hipolito Hernandez Jr. :  1955   Medical   Diagnosis:  Right leg pain [M79.604]  Right foot pain [M79.671] Treatment Diagnosis:  M79.661  Pain in right lower leg and M79.671  RIGHT FOOT PAIN    Referral Source:  Reyes, Jose S, MD Insurance:   Payor: [facility] MEDICARE / Plan: [facility] [employer] MEDIBLUE PLUS / Product Type: *No Product type* /                     Patient  verified YES    Visit #   Current  / Total 6 24   Time   In / Out 12:00p 12:40p   Total Treatment Time 40   Total Timed Codes 40   1:1 Treatment Time 40      [facility] Totals Reminder:  bill using total billable   min of TIMED therapeutic procedures and modalities.   8-22 min = 1 unit; 23-37 min = 2 units; 38-52 min = 3 units; 53-67 min = 4 units; 68-82 min = 5 units            SUBJECTIVE    Pain Level (0-10 scale): 0/10    Any medication changes, allergies to medications, adverse drug reactions, diagnosis change, or new procedure performed?: [x] No    [] Yes (see summary sheet for update)  Medications: Verified on Patient Summary List    Subjective functional status/changes:     Patient reports no significant pain today, just tightness in his calf.    OBJECTIVE      Therapeutic Procedures:  Tx Min Billable or 1:1 Min (if diff from Tx Min) Procedure, Rationale, Specifics   25  16800 Therapeutic Exercise (timed):  increase ROM, strength, coordination, balance, and proprioception to improve patient's ability to progress to PLOF and address remaining functional goals. (see flow sheet as applicable)     Details if applicable:     77 [id number] Neuromuscular Re-Education (timed):  improve balance, coordination, kinesthetic sense, posture, core stability and proprioception to improve patient's ability to develop conscious control of individual muscles and awareness of position of extremities in order to progress to PLOF and address

## 2025-05-08 NOTE — PROGRESS NOTES
Physical Therapy at [facility],   a part of [place] [address], [address]  [place], [place] [address]  Phone: [phone]  Fax: [phone]  PHYSICAL THERAPY PROGRESS NOTE  Patient Name:  Hipolito Hernandez Jr. :  1955   Treatment/Medical Diagnosis: Right leg pain [M79.604]  Right foot pain [M79.671]   Referral Source:  Reyes, Jose S, MD     Date of Initial Visit:  25 Attended Visits:  6 Missed Visits:  0     SUMMARY OF TREATMENT/ASSESSMENT:  Therapeutic exercises and neuromuscular re-education to address chronic R posterior     CURRENT STATUS/GOALS  Patient has met 1/3 STG's, but significant functional improvement via the FOTO score. He reports less overall pain at the R ankle and has tolerated a steady progression of strengthening exercises.     Short Term Goals: To be accomplished in 12 treatments.  Patient will demonstrate >10 degrees of R ankle DF PROM to allow for greater efficiency with gait. MET  Patient will be able to ambulate a flight of stairs with no R ankle pain or limitation.  NOT MET  Patient will be able to demonstrate >30 seconds of B tandem stance with EO to allow for decreased risk for falls.NOT MET  Long Term Goals: To be accomplished in 24 treatments.  Patient will be able to demonstrate 5/5 R PF MMT to allow for greater efficiency with gait.  Patient will be able to descend a flight of stairs without R ankle pain or limitation.  Patient will be able to demonstrate >30 seconds of B SLS with EO to allow for decreased risk for falls.        RECOMMENDATIONS FOR SKILLED THERAPY  Patient will be leaving for an extended trip at the end of the week and is welcomed to return to the clinic afterwards and continue PT to reach all LTG's.        Perfecto Rosario, PT       2025       2:02 PM    If you have any questions/comments please contact us directly at [phone].   Thank you for allowing us to assist in the care of your patient.

## 2025-06-18 ENCOUNTER — TRANSCRIBE ORDERS (OUTPATIENT)
Facility: HOSPITAL | Age: 70
End: 2025-06-18

## 2025-06-18 DIAGNOSIS — M21.961 ACQUIRED DEFORMITY OF KNEE, RIGHT: Primary | ICD-10-CM

## 2025-08-19 ENCOUNTER — HOSPITAL ENCOUNTER (OUTPATIENT)
Facility: HOSPITAL | Age: 70
Discharge: HOME | End: 2025-08-22
Attending: ORTHOPAEDIC SURGERY
Payer: MEDICARE

## 2025-08-19 DIAGNOSIS — M21.961 ACQUIRED DEFORMITY OF KNEE, RIGHT: ICD-10-CM

## 2025-08-19 PROCEDURE — 73700 CT LOWER EXTREMITY W/O DYE: CPT

## (undated) DEVICE — AMPLATZ ULTRA STIFF WIRE GUIDE: Brand: AMPLATZ

## (undated) DEVICE — COVER PRB INTOP 96X6 IN LF

## (undated) DEVICE — ELECTRODE PT RET AD L9FT HI MOIST COND ADH HYDRGEL CORDED

## (undated) DEVICE — GUIDEWIRE VASC L180CM DIA0.035IN 3MM PTFE J TIP EXCHG FIX

## (undated) DEVICE — PINNACLE INTRODUCER SHEATH: Brand: PINNACLE

## (undated) DEVICE — NEEDLE ANGIO 18GA L9CM NRML 1 WALL SMOOTH FINISH CLR HUB FOR

## (undated) DEVICE — THE ESOPHAGEAL COOLING DEVICE IS A THERMAL REGULATING DEVICE, INTENDED TO CONNECT TO A CINCINNATI SUB-ZERO BLANKETROL II OR BLANKETROL III HYPER-HYPOTHERMIA SYSTEM TO CONTROL PATIENT TEMPERATURE, AND PROVIDE GASTRIC DECOMPRESSION AND SUCTIONING.: Brand: ESOPHAGEAL COOLING DEVICE

## (undated) DEVICE — CATHETER ABLAT 8FR L115CM 1-6-2MM SPC TIP 3.5MM DF CRV

## (undated) DEVICE — SHEATH INTRO 8.5FR L71CM 8.5FR DIL GWIRE L180CM DIA0.032IN

## (undated) DEVICE — CATHETER KIT JL 4 JL5 6 FRX100 CM 110 CM 145 PGTL DXTERITY

## (undated) DEVICE — CATHETER EP 7FR L115CM 2-8-2MM SPC TIP 2MM FJ CRV ADV COMPR

## (undated) DEVICE — TUBING PMP FOR CARTO SYS SMARTABLATE

## (undated) DEVICE — HEART CATH-SFMC: Brand: MEDLINE INDUSTRIES, INC.

## (undated) DEVICE — CATHETER MAP D CRV 2-2-2-2-2 MM SPC PERSEID OCTARAY

## (undated) DEVICE — PATCH REF EXT FOR CARTO 3 SYS (EA = 6 PACKS)

## (undated) DEVICE — NEEDLE TRANSSEPTAL AD 18GA L71CM 30DEG BVL BRK-1 XS CRV S

## (undated) DEVICE — 3M™ WARMING BLANKET, UPPER BODY, 10 PER CASE, 42268: Brand: BAIR HUGGER™

## (undated) DEVICE — CATHETER EP 6FR L92CM 2-5-2MM SPC TIP 1MM 4 ELECTRD D CRV

## (undated) DEVICE — MEDI-TRACE CADENCE ADULT, DEFIBRILLATION ELECTRODE -RTS  (10 PR/PK) - PHYSIO-CONTROL: Brand: MEDI-TRACE CADENCE

## (undated) DEVICE — 3M™ BAIR HUGGER® SURGICAL ACCESS BLANKET, MIDSECTION, 10 PER CASE 57000: Brand: BAIR HUGGER™

## (undated) DEVICE — INTRODUCER SHTH 8.5FR L63CM 8.5FR DIL GWIRE L145CM

## (undated) DEVICE — SWAN-GANZ HI-SHORE TRUE SIZE THERMODILUTION CATHETER: Brand: SWAN-GANZ HI-SHORE TRUE SIZE

## (undated) DEVICE — Device

## (undated) DEVICE — GUIDEWIRE VASC L145CM DIA0035IN J TIP L3MM STD TAPR FIX COR

## (undated) DEVICE — CATHETER ABLAT 8FR L115CM 1-6-2MM SPC TIP 3.5MM FJ CRV

## (undated) DEVICE — TBG PRESS 72IN M/F FIXED BRAID: Brand: MEDLINE INDUSTRIES, INC.

## (undated) DEVICE — TUBING ANGIO L30IN ID18MM 1200PSI POLYUR FLX BRAID AIRLESS

## (undated) DEVICE — SURGICAL PROCEDURE TRAY CRD CATH 3 PRT

## (undated) DEVICE — CATHETER EP 7FR L115CM 2-8-2MM SPC TIP 2MM 10 ELECTRD F L

## (undated) DEVICE — PERCLOSE PROGLIDE™ SUTURE-MEDIATED CLOSURE SYSTEM: Brand: PERCLOSE PROGLIDE™

## (undated) DEVICE — TRAY SURG CUST CRD CATH 3 PRT SSR

## (undated) DEVICE — STERILE (15.2 TAPERED TO 7.6 X 183CM) POLYETHYLENE ACCORDION-FOLDED COVER FOR USE WITH SIEMENS ACUNAV ULTRASOUND CATHETER FAMILY CONNECTOR: Brand: SWIFTLINK TRANSDUCER COVER

## (undated) DEVICE — INTRODUCER SHTH SL1 0.032 IN 8.5 FRX63 CM FAST-CATH

## (undated) DEVICE — CATHETER REPROC ELCTRPHSLGY 10FR DIA 90CML DGNSTC ULTRSND F

## (undated) DEVICE — INTRO SHEATH TRANSSEPTAL 8.5FRX71CM